# Patient Record
Sex: MALE | Race: BLACK OR AFRICAN AMERICAN | Employment: FULL TIME | URBAN - METROPOLITAN AREA
[De-identification: names, ages, dates, MRNs, and addresses within clinical notes are randomized per-mention and may not be internally consistent; named-entity substitution may affect disease eponyms.]

---

## 2017-01-11 DIAGNOSIS — I10 ESSENTIAL HYPERTENSION: ICD-10-CM

## 2017-01-11 RX ORDER — HYDROCHLOROTHIAZIDE 25 MG/1
TABLET ORAL
Qty: 90 TAB | Refills: 2 | Status: SHIPPED | OUTPATIENT
Start: 2017-01-11

## 2017-02-08 LAB
CREATININE, EXTERNAL: 0.71
HBA1C MFR BLD HPLC: 7 %
LDL-C, EXTERNAL: 81
MICROALBUMIN UR TEST STR-MCNC: 0.7 MG/DL

## 2017-02-23 ENCOUNTER — OFFICE VISIT (OUTPATIENT)
Dept: ENDOCRINOLOGY | Age: 37
End: 2017-02-23

## 2017-02-23 VITALS
WEIGHT: 315 LBS | BODY MASS INDEX: 46.65 KG/M2 | HEART RATE: 80 BPM | TEMPERATURE: 97.7 F | SYSTOLIC BLOOD PRESSURE: 122 MMHG | RESPIRATION RATE: 24 BRPM | DIASTOLIC BLOOD PRESSURE: 71 MMHG | HEIGHT: 69 IN

## 2017-02-23 DIAGNOSIS — Z79.4 TYPE 2 DIABETES MELLITUS WITH HYPERGLYCEMIA, WITH LONG-TERM CURRENT USE OF INSULIN (HCC): Primary | ICD-10-CM

## 2017-02-23 DIAGNOSIS — I10 ESSENTIAL HYPERTENSION: ICD-10-CM

## 2017-02-23 DIAGNOSIS — E11.65 TYPE 2 DIABETES MELLITUS WITH HYPERGLYCEMIA, WITH LONG-TERM CURRENT USE OF INSULIN (HCC): Primary | ICD-10-CM

## 2017-02-23 DIAGNOSIS — E78.2 MIXED HYPERLIPIDEMIA: ICD-10-CM

## 2017-02-23 NOTE — MR AVS SNAPSHOT
Visit Information Date & Time Provider Department Dept. Phone Encounter #  
 2/23/2017  9:45 AM Karolina Oconnor MD [facility] Diabetes [facility] Endocr[facility]ology [phone] [id number] Follow-up Instructions Return in about 3 months (around 5/23/2017). Upcoming Health Maintenance Date Due  
 FOOT EXAM Q1 5/26/1989 EYE EXAM RETINAL OR DILATED Q1 5/26/1989 Pneumococcal 19-64 Medium Risk (1 of 1 - PPSV23) 5/26/1998 DTaP/Tdap/Td series (1 - Tdap) 5/26/2000 INFLUENZA AGE 9 TO ADULT 8/1/2016 MICROALBUMIN Q1 2/25/2017 HEMOGLOBIN A1C Q6M 3/1/2017 LIPID PANEL Q1 5/27/2017 Allergies as of 2/23/2017  Review Complete On: 2/23/2017 By: Fernando Johnston LPN No Known Allergies Current Immunizations  Never Reviewed No immunizations on file. Not reviewed this visit You Were Diagnosed With   
  
 Codes Comments Type 2 diabetes mellitus with hyperglycemia, with long-term current use of insulin (HCC)    -  Primary ICD-10-CM: E11.65, Z79.4 ICD-9-CM: 250.00, 790.29, V58.67 Essential hypertension     ICD-10-CM: I10 
ICD-9-CM: 401.9 Mixed hyperlipidemia     ICD-10-CM: E78.2 ICD-9-CM: 272.2 Vitals BP  
  
  
  
  
  
 122/71 (BP 1 Location: Right arm, BP Patient Position: Sitting) BMI and BSA Data Body Mass Index Body Surface Area 54.31 kg/m 2 2.85 m 2 [phone] Your Updated Medication List  
  
   
This list is accurate as of: 2/23/17 10:57 AM.  Always use your most recent med list.  
  
  
  
  
 cyclobenzaprine 10 mg tablet Commonly known as:  FLEXERIL  
TAKE 1 TABLET TWICE A DAY AS NEEDED  
  
 furosemide 20 mg tablet Commonly known as:  LASIX Take 1 tab every 3 days as neeed  
  
 glucose blood VI test strips strip Commonly known as:  CONTOUR NEXT STRIPS  
 Test blood glucose twice daily Dx Code: E11.65  
  
 hydroCHLOROthiazide 25 mg tablet Commonly known as:  HYDRODIURIL  
TAKE 1 TABLET DAILY  
  
 insulin detemir 100 unit/mL injection Commonly known as:  LEVEMIR Inject 36 units daily  
  
 lisinopril 40 mg tablet Commonly known as:  Brad Shutters Take 1 Tab by mouth daily. metFORMIN 1,000 mg tablet Commonly known as:  GLUCOPHAGE Take 1 Tab by mouth two (2) times daily (with meals). metoprolol tartrate 50 mg tablet Commonly known as:  LOPRESSOR Take 1 Tab by mouth daily. simvastatin 20 mg tablet Commonly known as:  ZOCOR Take 1 Tab by mouth nightly. Follow-up Instructions Return in about 3 months (around 5/23/2017). Introducing [facility] & HEALTH SERVICES! [employer] introduces FedBid patient portal. Now you can access parts of your medical record, email your doctor's office, and request medication refills online. 1. In your internet browser, go to [email or web address] 2. Click on the First Time User? Click Here link in the Sign In box. You will see the New Member Sign Up page. 3. Enter your FedBid Access Code exactly as it appears below. You will not need to use this code after youve completed the sign-up process. If you do not sign up before the expiration date, you must request a new code. · FedBid Access Code: [email or web address] Expires: 5/24/2017 10:57 AM 
 
4. Enter the last four digits of your Social Security Number (xxxx) and Date of Birth (mm/dd/yyyy) as indicated and click Submit. You will be taken to the next sign-up page. 5. Create a FedBid ID. This will be your FedBid login ID and cannot be changed, so think of one that is secure and easy to remember. 6. Create a FedBid password. You can change your password at any time. 7. Enter your Password Reset Question and Answer. This can be used at a later time if you forget your password. 8. Enter your e-mail address. You will receive e-mail notification when new information is available in [address] 9. Click Sign Up. You can now view and download portions of your medical record. 10. Click the Download Summary menu link to download a portable copy of your medical information. If you have questions, please visit the Frequently Asked Questions section of the MangoPlate website. Remember, MangoPlate is NOT to be used for urgent needs. For medical emergencies, dial 911. Now available from your iPhone and Android! Please provide this summary of care documentation to your next provider. Your primary care clinician is listed as [address] [facility]. If you have any questions after today's visit, please call [phone].

## 2017-02-23 NOTE — PROGRESS NOTES
[facility]               Jessica Boles MD        [address] [phone] Fax [phone]  OV-HY) [address] [place] [address] JT:[phone] Fax [phone] ( Monday)          Patient Information  Date:2/23/2017  Name : Kady Patel 39 y.o.     YOB: 1980         Referred by: MD Shraddha Perea MD      Chief Complaint   Patient presents with    Diabetes     f/u       History of Present Illness: Kady Patel is a 39 y.o. male here for fu  of  Type 2 Diabetes Mellitus. He was seen a month ago and his A1c was 7.4. Stopped the medications and diet was unhealthy. He was hospitalized with ketoacidosis, glucose was 500, A1c increased to 10. Reviewed hospital records, no infection. Reports to have had  DKA three years ago. Checking glucose at home  Has not noticed significant hypoglycemia     Compliant with medications    No acute issues          Fasting < 130     He has severe insulin resistance. Wt Readings from Last 3 Encounters:   02/23/17 (!) 367 lb 12.8 oz (166.8 kg)   11/14/16 (!) 351 lb (159.2 kg)   10/18/16 345 lb 6.4 oz (156.7 kg)       BP Readings from Last 3 Encounters:   02/23/17 122/71   11/14/16 134/85   10/18/16 116/81           Past Medical History:   Diagnosis Date    Bronchitis     Diabetes ([facility] [address]     HTN (hypertension)     Hyperlipidemia      Current Outpatient Prescriptions   Medication Sig    hydroCHLOROthiazide (HYDRODIURIL) 25 mg tablet TAKE 1 TABLET DAILY    glucose blood VI test strips (CONTOUR NEXT STRIPS) strip Test blood glucose twice daily Dx Code: E11.65    cyclobenzaprine (FLEXERIL) 10 mg tablet TAKE 1 TABLET TWICE A DAY AS NEEDED    insulin detemir (LEVEMIR) 100 unit/mL injection Inject 36 units daily    furosemide (LASIX) 20 mg tablet Take 1 tab every 3 days as neeed    simvastatin (ZOCOR) 20 mg tablet Take 1 Tab by mouth nightly.     lisinopril (PRINIVIL, ZESTRIL) 40 mg tablet Take 1 Tab by mouth daily.  metoprolol (LOPRESSOR) 50 mg tablet Take 1 Tab by mouth daily. (Patient taking differently: Take 25 mg by mouth daily.)    metFORMIN (GLUCOPHAGE) 1,000 mg tablet Take 1 Tab by mouth two (2) times daily (with meals). No current facility-administered medications for this visit. No Known Allergies      Review of Systems:  - Constitutional Symptoms: no fevers, no chills,  - Eyes: no blurry vision no double vision  - Musculoskeletal: no joint pains +  weakness  - Integumentary: no rashes  - Neurological: no numbness, tingling, +  headaches  -   -     Physical Examination:   Blood pressure 122/71, pulse 80, temperature 97.7 °F (36.5 °C), temperature source Oral, resp. rate 24, height 5' 9\" (1.753 m), weight (!) 367 lb 12.8 oz (166.8 kg). Estimated body mass index is 54.31 kg/(m^2) as calculated from the following:    Height as of this encounter: 5' 9\" (1.753 m). -   Weight as of this encounter: 367 lb 12.8 oz (166.8 kg). - General: pleasant, no distress, good eye contact,obese  - HEENT: no pallor, no periorbital edema, EOMI  - Neck: supple, no thyromegaly,   - Cardiovascular: regular, normal rate, normal S1 and S2,  - Respiratory: clear to auscultation bilaterally,  - Gastrointestinal: soft, nontender, nondistended,  BS +  - Musculoskeletal: 2 + edema, venous stasis changes, flat feet   - Neurological: alert and oriented  - Psychiatric: normal mood and affect  - Skin: color, texture, turgor normal.       Data Reviewed:     [] Glucose records reviewed. [] See glucose records for details (to be scanned).   [] A1C  [] Reviewed labs    Lab Results   Component Value Date/Time    Hemoglobin A1c 6.7 05/27/2016 10:24 AM    Hemoglobin A1c 6.3 02/25/2016 09:09 AM    Hemoglobin A1c 6.8 07/09/2015 08:10 AM    Glucose 259 10/18/2016 10:39 AM    Glucose  11/23/2015 10:45 AM    Microalb/Creat ratio (ug/mg creat.) 3.7 02/25/2016 09:09 AM    LDL, calculated 81 05/27/2016 10:24 AM    Creatinine 0.89 10/18/2016 10:39 AM    Hemoglobin A1c, External 7.30 11/13/2014 04:29 AM      Lab Results   Component Value Date/Time    Cholesterol, total 141 05/27/2016 10:24 AM    HDL Cholesterol 43 05/27/2016 10:24 AM    LDL, calculated 81 05/27/2016 10:24 AM    Triglyceride 83 05/27/2016 10:24 AM     Lab Results   Component Value Date/Time    ALT (SGPT) 49 10/18/2016 10:39 AM    AST (SGOT) 23 10/18/2016 10:39 AM    Alk. phosphatase 68 10/18/2016 10:39 AM    Bilirubin, total 0.4 10/18/2016 10:39 AM     Lab Results   Component Value Date/Time    GFR est  10/18/2016 10:39 AM    GFR est non- 10/18/2016 10:39 AM    Creatinine 0.89 10/18/2016 10:39 AM    BUN 9 10/18/2016 10:39 AM    Sodium 136 10/18/2016 10:39 AM    Potassium 3.5 10/18/2016 10:39 AM    Chloride 89 10/18/2016 10:39 AM    CO2 27 10/18/2016 10:39 AM      Lab Results   Component Value Date/Time    TSH 1.050 07/09/2015 08:10 AM        Assessment/Plan:     1. Type 2 diabetes mellitus with hyperglycemia, with long-term current use of insulin ([address]    2. Essential hypertension    3. Mixed hyperlipidemia        1. Type 2 Diabetes Mellitus with no nephropathy,neuropathy,retinopathy  Lab Results   Component Value Date/Time    Hemoglobin A1c 6.7 05/27/2016 10:24 AM    Hemoglobin A1c (POC) 7.4 09/01/2016 11:38 AM    Hemoglobin A1c, External 7.30 11/13/2014 04:29 AM   A1C is 7     metformin  Levemir 36 units   Check C peptide, REGAN  DKA x2       2. HTN : Continue current therapy , CPAP     3. Hyperlipidemia : Continue statin. 4.Obesity:Body mass index is 54.31 kg/(m^2). Discussed about the importance of exercise and carbohydrate portion control. TSH normal   DST normal   He is exercising and eating healthy   2500 - 2800 cari diet   Weight loss encouraged     5 EMRE - CPAP -         There are no Patient Instructions on file for this visit.   Follow-up Disposition: Not on File    Thank you for allowing me to participate in the care of this patient. Chintan Araujo MD    >50% time spent counseling - diet , compliance with meds. , excercise

## 2017-02-23 NOTE — PROGRESS NOTES
Leighton Hood is a 39 y.o. male here for   Chief Complaint   Patient presents with    Diabetes     f/u       Functional glucose monitor and record keeping system? - yes  Eye exam within last year? - yes  Foot exam within last year? - yes    Lab Results   Component Value Date/Time    Hemoglobin A1c 6.7 05/27/2016 10:24 AM    Hemoglobin A1c (POC) 7.4 09/01/2016 11:38 AM    Hemoglobin A1c, External 7.30 11/13/2014 04:29 AM       Wt Readings from Last 3 Encounters:   11/14/16 (!) 351 lb (159.2 kg)   10/18/16 345 lb 6.4 oz (156.7 kg)   09/01/16 (!) 377 lb (171 kg)     Temp Readings from Last 3 Encounters:   11/14/16 98 °F (36.7 °C) (Oral)   10/18/16 98.2 °F (36.8 °C) (Oral)   09/01/16 98.4 °F (36.9 °C)     BP Readings from Last 3 Encounters:   11/14/16 134/85   10/18/16 116/81   09/01/16 122/72     Pulse Readings from Last 3 Encounters:   11/14/16 79   10/18/16 (!) 106   09/01/16 83     '

## 2017-02-24 DIAGNOSIS — Z79.4 UNCONTROLLED TYPE 2 DIABETES MELLITUS WITH HYPERGLYCEMIA, WITH LONG-TERM CURRENT USE OF INSULIN (HCC): ICD-10-CM

## 2017-02-24 DIAGNOSIS — E11.65 UNCONTROLLED TYPE 2 DIABETES MELLITUS WITH HYPERGLYCEMIA, WITH LONG-TERM CURRENT USE OF INSULIN (HCC): ICD-10-CM

## 2017-02-24 DIAGNOSIS — E78.2 MIXED HYPERLIPIDEMIA: ICD-10-CM

## 2017-02-24 DIAGNOSIS — I10 ESSENTIAL HYPERTENSION: ICD-10-CM

## 2017-02-24 RX ORDER — METOPROLOL TARTRATE 50 MG/1
50 TABLET ORAL DAILY
Qty: 90 TAB | Refills: 3 | Status: SHIPPED | OUTPATIENT
Start: 2017-02-24 | End: 2017-03-29 | Stop reason: SDUPTHER

## 2017-03-05 DIAGNOSIS — E78.2 MIXED HYPERLIPIDEMIA: ICD-10-CM

## 2017-03-05 DIAGNOSIS — I10 ESSENTIAL HYPERTENSION: ICD-10-CM

## 2017-03-05 RX ORDER — SIMVASTATIN 20 MG/1
TABLET, FILM COATED ORAL
Qty: 90 TAB | Refills: 2 | Status: SHIPPED | OUTPATIENT
Start: 2017-03-05 | End: 2020-04-17 | Stop reason: SDUPTHER

## 2017-03-05 RX ORDER — LISINOPRIL 40 MG/1
TABLET ORAL
Qty: 90 TAB | Refills: 2 | Status: SHIPPED | OUTPATIENT
Start: 2017-03-05 | End: 2019-02-07

## 2017-03-10 DIAGNOSIS — E11.65 TYPE 2 DIABETES MELLITUS WITH HYPERGLYCEMIA, WITH LONG-TERM CURRENT USE OF INSULIN (HCC): ICD-10-CM

## 2017-03-10 DIAGNOSIS — E78.2 MIXED HYPERLIPIDEMIA: ICD-10-CM

## 2017-03-10 DIAGNOSIS — I10 ESSENTIAL HYPERTENSION: ICD-10-CM

## 2017-03-10 DIAGNOSIS — Z79.4 TYPE 2 DIABETES MELLITUS WITH HYPERGLYCEMIA, WITH LONG-TERM CURRENT USE OF INSULIN (HCC): ICD-10-CM

## 2017-03-13 DIAGNOSIS — Z79.4 TYPE 2 DIABETES MELLITUS WITH HYPERGLYCEMIA, WITH LONG-TERM CURRENT USE OF INSULIN (HCC): Primary | ICD-10-CM

## 2017-03-13 DIAGNOSIS — E11.65 UNCONTROLLED TYPE 2 DIABETES MELLITUS WITH HYPERGLYCEMIA, WITH LONG-TERM CURRENT USE OF INSULIN (HCC): ICD-10-CM

## 2017-03-13 DIAGNOSIS — Z79.4 UNCONTROLLED TYPE 2 DIABETES MELLITUS WITH HYPERGLYCEMIA, WITH LONG-TERM CURRENT USE OF INSULIN (HCC): ICD-10-CM

## 2017-03-13 DIAGNOSIS — E11.65 TYPE 2 DIABETES MELLITUS WITH HYPERGLYCEMIA, WITH LONG-TERM CURRENT USE OF INSULIN (HCC): Primary | ICD-10-CM

## 2017-03-20 DIAGNOSIS — Z79.4 TYPE 2 DIABETES MELLITUS WITH HYPERGLYCEMIA, WITH LONG-TERM CURRENT USE OF INSULIN (HCC): Primary | ICD-10-CM

## 2017-03-20 DIAGNOSIS — E11.65 TYPE 2 DIABETES MELLITUS WITH HYPERGLYCEMIA, WITH LONG-TERM CURRENT USE OF INSULIN (HCC): Primary | ICD-10-CM

## 2017-03-20 RX ORDER — INSULIN GLARGINE 100 [IU]/ML
INJECTION, SOLUTION SUBCUTANEOUS
Qty: 45 ML | Refills: 3 | Status: SHIPPED | OUTPATIENT
Start: 2017-03-20 | End: 2017-03-24 | Stop reason: SDUPTHER

## 2017-03-20 RX ORDER — PEN NEEDLE, DIABETIC 31 GX3/16"
NEEDLE, DISPOSABLE MISCELLANEOUS
Qty: 100 PEN NEEDLE | Refills: 3 | Status: SHIPPED | OUTPATIENT
Start: 2017-03-20 | End: 2017-03-24 | Stop reason: SDUPTHER

## 2017-03-20 NOTE — TELEPHONE ENCOUNTER
Pt came in stating that his levemir insulin is going to cost him around $500 and he cannot afford that. Can something else be called in, or do we know if a PA is required. Please call pt let him know what needs to be done.

## 2017-03-24 DIAGNOSIS — E11.65 TYPE 2 DIABETES MELLITUS WITH HYPERGLYCEMIA, WITH LONG-TERM CURRENT USE OF INSULIN (HCC): ICD-10-CM

## 2017-03-24 DIAGNOSIS — Z79.4 TYPE 2 DIABETES MELLITUS WITH HYPERGLYCEMIA, WITH LONG-TERM CURRENT USE OF INSULIN (HCC): ICD-10-CM

## 2017-03-24 RX ORDER — PEN NEEDLE, DIABETIC 31 GX3/16"
NEEDLE, DISPOSABLE MISCELLANEOUS
Qty: 100 PEN NEEDLE | Refills: 3 | Status: SHIPPED | OUTPATIENT
Start: 2017-03-24 | End: 2018-05-04 | Stop reason: SDUPTHER

## 2017-03-24 RX ORDER — INSULIN GLARGINE 100 [IU]/ML
INJECTION, SOLUTION SUBCUTANEOUS
Qty: 45 ML | Refills: 3 | Status: SHIPPED | OUTPATIENT
Start: 2017-03-24 | End: 2017-05-24 | Stop reason: SDUPTHER

## 2017-03-24 NOTE — TELEPHONE ENCOUNTER
Patient says Mario Arpitadele is too expensive and ailyn would like something cheap sent to [facility] pharmacy. Patient says he will run out soon.

## 2017-03-29 DIAGNOSIS — I10 ESSENTIAL HYPERTENSION: ICD-10-CM

## 2017-03-29 DIAGNOSIS — Z79.4 UNCONTROLLED TYPE 2 DIABETES MELLITUS WITH HYPERGLYCEMIA, WITH LONG-TERM CURRENT USE OF INSULIN (HCC): ICD-10-CM

## 2017-03-29 DIAGNOSIS — E78.2 MIXED HYPERLIPIDEMIA: ICD-10-CM

## 2017-03-29 DIAGNOSIS — E11.65 UNCONTROLLED TYPE 2 DIABETES MELLITUS WITH HYPERGLYCEMIA, WITH LONG-TERM CURRENT USE OF INSULIN (HCC): ICD-10-CM

## 2017-03-29 RX ORDER — METOPROLOL TARTRATE 50 MG/1
50 TABLET ORAL DAILY
Qty: 90 TAB | Refills: 3 | Status: SHIPPED | OUTPATIENT
Start: 2017-03-29

## 2017-04-26 ENCOUNTER — TELEPHONE (OUTPATIENT)
Dept: ENDOCRINOLOGY | Age: 37
End: 2017-04-26

## 2017-04-26 DIAGNOSIS — E11.65 TYPE 2 DIABETES MELLITUS WITH HYPERGLYCEMIA, WITH LONG-TERM CURRENT USE OF INSULIN (HCC): Primary | ICD-10-CM

## 2017-04-26 DIAGNOSIS — Z79.4 TYPE 2 DIABETES MELLITUS WITH HYPERGLYCEMIA, WITH LONG-TERM CURRENT USE OF INSULIN (HCC): Primary | ICD-10-CM

## 2017-05-24 ENCOUNTER — OFFICE VISIT (OUTPATIENT)
Dept: ENDOCRINOLOGY | Age: 37
End: 2017-05-24

## 2017-05-24 VITALS
SYSTOLIC BLOOD PRESSURE: 126 MMHG | TEMPERATURE: 98.8 F | DIASTOLIC BLOOD PRESSURE: 78 MMHG | BODY MASS INDEX: 46.65 KG/M2 | OXYGEN SATURATION: 90 % | WEIGHT: 315 LBS | HEART RATE: 89 BPM | HEIGHT: 69 IN | RESPIRATION RATE: 18 BRPM

## 2017-05-24 DIAGNOSIS — E11.65 TYPE 2 DIABETES MELLITUS WITH HYPERGLYCEMIA, WITH LONG-TERM CURRENT USE OF INSULIN (HCC): Primary | ICD-10-CM

## 2017-05-24 DIAGNOSIS — Z79.4 UNCONTROLLED TYPE 2 DIABETES MELLITUS WITH HYPERGLYCEMIA, WITH LONG-TERM CURRENT USE OF INSULIN (HCC): Primary | ICD-10-CM

## 2017-05-24 DIAGNOSIS — E11.65 UNCONTROLLED TYPE 2 DIABETES MELLITUS WITH HYPERGLYCEMIA, WITH LONG-TERM CURRENT USE OF INSULIN (HCC): Primary | ICD-10-CM

## 2017-05-24 DIAGNOSIS — I10 ESSENTIAL HYPERTENSION: ICD-10-CM

## 2017-05-24 DIAGNOSIS — Z79.4 TYPE 2 DIABETES MELLITUS WITH HYPERGLYCEMIA, WITH LONG-TERM CURRENT USE OF INSULIN (HCC): Primary | ICD-10-CM

## 2017-05-24 DIAGNOSIS — E78.2 MIXED HYPERLIPIDEMIA: ICD-10-CM

## 2017-05-24 LAB
GLUCOSE POC: 309 MG/DL
HBA1C MFR BLD HPLC: 11 %

## 2017-05-24 RX ORDER — INSULIN GLARGINE 100 [IU]/ML
40 INJECTION, SOLUTION SUBCUTANEOUS DAILY
Qty: 15 ML | Refills: 11 | Status: SHIPPED | OUTPATIENT
Start: 2017-05-24 | End: 2017-07-14 | Stop reason: ALTCHOICE

## 2017-05-24 NOTE — PROGRESS NOTES
Eye exam: within last year  Foot exam: within last year    Lab Results   Component Value Date/Time    Hemoglobin A1c 6.7 05/27/2016 10:24 AM    Hemoglobin A1c (POC) 7.4 09/01/2016 11:38 AM    Hemoglobin A1c, External 7.0 02/08/2017

## 2017-05-24 NOTE — MR AVS SNAPSHOT
Visit Information Date & Time Provider Department Dept. Phone Encounter #  
 5/24/2017  9:30 AM Jaylin Wolf MD [facility] Endocrinology [phone] [id number] Follow-up Instructions Return in about 2 months (around 7/24/2017). Upcoming Health Maintenance Date Due  
 FOOT EXAM Q1 5/26/1989 EYE EXAM RETINAL OR DILATED Q1 5/26/1989 Pneumococcal 19-64 Medium Risk (1 of 1 - PPSV23) 5/26/1998 DTaP/Tdap/Td series (1 - Tdap) 5/26/2000 INFLUENZA AGE 9 TO ADULT 8/1/2017 HEMOGLOBIN A1C Q6M 8/8/2017 MICROALBUMIN Q1 2/8/2018 LIPID PANEL Q1 2/8/2018 Allergies as of 5/24/2017  Review Complete On: 5/24/2017 By: Teto Gil LPN No Known Allergies Current Immunizations  Never Reviewed No immunizations on file. Not reviewed this visit You Were Diagnosed With   
  
 Codes Comments Uncontrolled type 2 diabetes mellitus with hyperglycemia, with long-term current use of insulin (HCC)    -  Primary ICD-10-CM: E11.65, Z79.4 ICD-9-CM: 250.02, V58.67 Vitals BP Pulse Temp Resp Height(growth percentile) Weight(growth percentile) 126/78 (BP 1 Location: Left arm, BP Patient Position: Sitting) 89 98.8 °F (37.1 °C) (Oral) 18 5' 9\" (1.753 m) (!) 365 lb (165.6 kg) SpO2 BMI Smoking Status 90% 53.9 kg/m2 Former Smoker Vitals History BMI and BSA Data Body Mass Index Body Surface Area 53.9 kg/m 2 2.84 m 2 Preferred Pharmacy Pharmacy Name Phone [id number], [address] [facility] [phone] Your Updated Medication List  
  
   
This list is accurate as of: 5/24/17  9:48 AM.  Always use your most recent med list.  
  
  
  
  
 cyclobenzaprine 10 mg tablet Commonly known as:  FLEXERIL  
TAKE 1 TABLET TWICE A DAY AS NEEDED  
  
 furosemide 20 mg tablet Commonly known as:  LASIX Take 1 tab every 3 days as neeed  
  
 glucose blood VI test strips strip Commonly known as:  ONETOUCH ULTRA TEST Test blood glucose three times daily Dx Code: E11.65  
  
 hydroCHLOROthiazide 25 mg tablet Commonly known as:  HYDRODIURIL  
TAKE 1 TABLET DAILY  
  
 insulin glargine 100 unit/mL (3 mL) Inpn Commonly known asTara Mo Inject 36 units daily Stop Levemir Insulin Needles (Disposable) 32 gauge x 5/32\" Ndle Use to inject Basaglar daily Dx Code: E11.65  
  
 lisinopril 40 mg tablet Commonly known as:  PRINIVIL, ZESTRIL  
TAKE 1 TABLET DAILY. DISREGARD 30 MG DOSE  
  
 metFORMIN 1,000 mg tablet Commonly known as:  GLUCOPHAGE Take 1 Tab by mouth two (2) times daily (with meals). metoprolol tartrate 50 mg tablet Commonly known as:  LOPRESSOR Take 1 Tab by mouth daily. simvastatin 20 mg tablet Commonly known as:  ZOCOR  
TAKE 1 TABLET NIGHTLY We Performed the Following AMB POC GLUCOSE BLOOD, BY GLUCOSE MONITORING DEVICE [12131 CPT(R)] AMB POC HEMOGLOBIN A1C [83992 CPT(R)] Follow-up Instructions Return in about 2 months (around 7/24/2017). Patient Instructions Start Lantus Introducing \[facility]\"" & HEALTH SERVICES! [employer] introduces [employer] patient portal. Now you can access parts of your medical record, email your doctor's office, and request medication refills online. 1. In your internet browser, go to [email or web address] 2. Click on the First Time User? Click Here link in the Sign In box. You will see the New Member Sign Up page. 3. Enter your [employer] Access Code exactly as it appears below. You will not need to use this code after youve completed the sign-up process. If you do not sign up before the expiration date, you must request a new code. · [employer] Access Code: [email or web address] Expires: 5/24/2017 11:57 AM 
 
4. Enter the last four digits of your Social Security Number (xxxx) and Date of Birth (mm/dd/yyyy) as indicated and click Submit.  You will be taken to the next sign-up page. 5. Create a MediaMath ID. This will be your MediaMath login ID and cannot be changed, so think of one that is secure and easy to remember. 6. Create a MediaMath password. You can change your password at any time. 7. Enter your Password Reset Question and Answer. This can be used at a later time if you forget your password. 8. Enter your e-mail address. You will receive e-mail notification when new information is available in [address] 9. Click Sign Up. You can now view and download portions of your medical record. 10. Click the Download Summary menu link to download a portable copy of your medical information. If you have questions, please visit the Frequently Asked Questions section of the MediaMath website. Remember, MediaMath is NOT to be used for urgent needs. For medical emergencies, dial 911. Now available from your iPhone and Android! Please provide this summary of care documentation to your next provider. Your primary care clinician is listed as [address] [facility]. If you have any questions after today's visit, please call [phone].

## 2017-05-24 NOTE — PROGRESS NOTES
[facility]               Aranza Michelle MD        [address] 78 [phone] Fax [phone] ( T          Patient Information  Date:5/24/2017  Name : Skeet Barthel 39 y.o.     YOB: 1980         Referred by: MD Golden Alvarez MD      Chief Complaint   Patient presents with    Diabetes       History of Present Illness: Skeet Barthel is a 39 y.o. male here for fu  of  Type 2 Diabetes Mellitus. Reports to have had  DKA three years ago. A1c in February was 7 and now it is 11, blood glucose is 300. Reports to be taking insulin, however it is expensive and he has to pay $300 and I'm not sure he's taking it consistently. On Metformin. No polyuria. No steroid injection. He has severe insulin resistance. Wt Readings from Last 3 Encounters:   05/24/17 (!) 365 lb (165.6 kg)   02/23/17 (!) 367 lb 12.8 oz (166.8 kg)   11/14/16 (!) 351 lb (159.2 kg)       BP Readings from Last 3 Encounters:   05/24/17 126/78   02/23/17 122/71   11/14/16 134/85           Past Medical History:   Diagnosis Date    Bronchitis     Diabetes ([address]     HTN (hypertension)     Hyperlipidemia      Current Outpatient Prescriptions   Medication Sig    glucose blood VI test strips (ONETOUCH ULTRA TEST) strip Test blood glucose three times daily Dx Code: E11.65    metoprolol tartrate (LOPRESSOR) 50 mg tablet Take 1 Tab by mouth daily.  insulin glargine (BASAGLAR KWIKPEN) 100 unit/mL (3 mL) pen Inject 36 units daily Stop Levemir    Insulin Needles, Disposable, 32 gauge x 5/32\" ndle Use to inject Basaglar daily Dx Code: E11.65    simvastatin (ZOCOR) 20 mg tablet TAKE 1 TABLET NIGHTLY    lisinopril (PRINIVIL, ZESTRIL) 40 mg tablet TAKE 1 TABLET DAILY.  DISREGARD 30 MG DOSE    hydroCHLOROthiazide (HYDRODIURIL) 25 mg tablet TAKE 1 TABLET DAILY    furosemide (LASIX) 20 mg tablet Take 1 tab every 3 days as neeed    metFORMIN (GLUCOPHAGE) 1,000 mg tablet Take 1 Tab by mouth two (2) times daily (with meals).  cyclobenzaprine (FLEXERIL) 10 mg tablet TAKE 1 TABLET TWICE A DAY AS NEEDED     No current facility-administered medications for this visit. No Known Allergies      Review of Systems:  - Constitutional Symptoms: no fevers, no chills,  - Eyes: no blurry vision no double vision  - Musculoskeletal: no joint pains +  weakness  - Integumentary: no rashes  - Neurological: no numbness, tingling, +  headaches  -   -     Physical Examination:   Blood pressure 126/78, pulse 89, temperature 98.8 °F (37.1 °C), temperature source Oral, resp. rate 18, height 5' 9\" (1.753 m), weight (!) 365 lb (165.6 kg), SpO2 90 %. Estimated body mass index is 53.9 kg/(m^2) as calculated from the following:    Height as of this encounter: 5' 9\" (1.753 m). -   Weight as of this encounter: 365 lb (165.6 kg). - General: pleasant, no distress, good eye contact,obese  - HEENT: no pallor, no periorbital edema, EOMI  - Neck: supple, no thyromegaly,   - Cardiovascular: regular, normal rate, normal S1 and S2,  - Respiratory: clear to auscultation bilaterally,  - Gastrointestinal: soft, nontender, nondistended,  BS +  - Musculoskeletal: 2 + edema, venous stasis changes, flat feet   - Neurological: alert and oriented  - Psychiatric: normal mood and affect  - Skin: color, texture, turgor normal.       Data Reviewed:     [] Glucose records reviewed. [] See glucose records for details (to be scanned).   [] A1C  [] Reviewed labs    Lab Results   Component Value Date/Time    Hemoglobin A1c 6.7 05/27/2016 10:24 AM    Hemoglobin A1c 6.3 02/25/2016 09:09 AM    Hemoglobin A1c 6.8 07/09/2015 08:10 AM    Glucose 259 10/18/2016 10:39 AM    Glucose  05/24/2017 09:21 AM    Microalb/Creat ratio (ug/mg creat.) 3.7 02/25/2016 09:09 AM    LDL, calculated 81 05/27/2016 10:24 AM    Creatinine 0.89 10/18/2016 10:39 AM    Hemoglobin A1c, External 7.0 02/08/2017    Hemoglobin A1c, External 7.30 11/13/2014 04:29 AM      Lab Results   Component Value Date/Time    Cholesterol, total 141 05/27/2016 10:24 AM    HDL Cholesterol 43 05/27/2016 10:24 AM    LDL, calculated 81 05/27/2016 10:24 AM    Triglyceride 83 05/27/2016 10:24 AM     Lab Results   Component Value Date/Time    ALT (SGPT) 49 10/18/2016 10:39 AM    AST (SGOT) 23 10/18/2016 10:39 AM    Alk. phosphatase 68 10/18/2016 10:39 AM    Bilirubin, total 0.4 10/18/2016 10:39 AM     Lab Results   Component Value Date/Time    GFR est  10/18/2016 10:39 AM    GFR est non- 10/18/2016 10:39 AM    Creatinine 0.89 10/18/2016 10:39 AM    BUN 9 10/18/2016 10:39 AM    Sodium 136 10/18/2016 10:39 AM    Potassium 3.5 10/18/2016 10:39 AM    Chloride 89 10/18/2016 10:39 AM    CO2 27 10/18/2016 10:39 AM      Lab Results   Component Value Date/Time    TSH 1.050 07/09/2015 08:10 AM        Assessment/Plan:     1. Uncontrolled type 2 diabetes mellitus with hyperglycemia, with long-term current use of insulin ([address]        1. Type 2 Diabetes Mellitus with no nephropathy,neuropathy,retinopathy  Lab Results   Component Value Date/Time    Hemoglobin A1c 6.7 05/27/2016 10:24 AM    Hemoglobin A1c (POC) 11.0 05/24/2017 09:22 AM    Hemoglobin A1c, External 7.0 02/08/2017   Very high deductible plan, his deductible is $5,000 and not able to afford insulin. Switch it to Lantus and gave discount card for $10.    Probably compliance is decreased due to expense of the insulin. Continue Metformin. Discussed the goals of blood glucose. metformin  DKA x2       2. HTN : Continue current therapy , CPAP     3. Hyperlipidemia : Continue statin. 4.Obesity:Body mass index is 53.9 kg/(m^2). Discussed about the importance of exercise and carbohydrate portion control. TSH normal   DST normal   He is exercising and eating healthy   2500 - 2800 cari diet   Weight loss encouraged     5 EMRE - CPAP -         There are no Patient Instructions on file for this visit.   Follow-up Disposition: Not on File    Thank you for allowing me to participate in the care of this patient. Atif aJcobs MD    >50% time spent counseling - diet , compliance with meds. , excercise

## 2017-07-05 ENCOUNTER — TELEPHONE (OUTPATIENT)
Dept: ENDOCRINOLOGY | Age: 37
End: 2017-07-05

## 2017-07-05 NOTE — TELEPHONE ENCOUNTER
He was on Basiglar and that he should be able to get for less than $10 with coupon       He can go back on [employer]

## 2017-07-05 NOTE — TELEPHONE ENCOUNTER
Patient just discharged  from hospital and they changed his insulin to Humulin, he can not afford it, can he go back to what he was originally on. Please call to confirm. Thank you.  [phone]

## 2017-07-05 NOTE — TELEPHONE ENCOUNTER
Informed pt of Dr. Navarrete General note. Pt verbalized understanding with no further questions or concerns at this time.

## 2017-07-14 DIAGNOSIS — E11.65 TYPE 2 DIABETES MELLITUS WITH HYPERGLYCEMIA, WITH LONG-TERM CURRENT USE OF INSULIN (HCC): Primary | ICD-10-CM

## 2017-07-14 DIAGNOSIS — Z79.4 TYPE 2 DIABETES MELLITUS WITH HYPERGLYCEMIA, WITH LONG-TERM CURRENT USE OF INSULIN (HCC): Primary | ICD-10-CM

## 2017-07-14 RX ORDER — INSULIN GLARGINE 100 [IU]/ML
INJECTION, SOLUTION SUBCUTANEOUS
Qty: 15 ML | Refills: 11 | Status: SHIPPED | OUTPATIENT
Start: 2017-07-14 | End: 2017-07-24 | Stop reason: ALTCHOICE

## 2017-07-21 ENCOUNTER — TELEPHONE (OUTPATIENT)
Dept: ENDOCRINOLOGY | Age: 37
End: 2017-07-21

## 2017-07-24 ENCOUNTER — OFFICE VISIT (OUTPATIENT)
Dept: ENDOCRINOLOGY | Age: 37
End: 2017-07-24

## 2017-07-24 ENCOUNTER — TELEPHONE (OUTPATIENT)
Dept: ENDOCRINOLOGY | Age: 37
End: 2017-07-24

## 2017-07-24 VITALS
RESPIRATION RATE: 18 BRPM | WEIGHT: 315 LBS | SYSTOLIC BLOOD PRESSURE: 126 MMHG | HEART RATE: 78 BPM | TEMPERATURE: 97.9 F | DIASTOLIC BLOOD PRESSURE: 73 MMHG | OXYGEN SATURATION: 90 % | HEIGHT: 69 IN | BODY MASS INDEX: 46.65 KG/M2

## 2017-07-24 DIAGNOSIS — E11.65 TYPE 2 DIABETES MELLITUS WITH HYPERGLYCEMIA, WITH LONG-TERM CURRENT USE OF INSULIN (HCC): Primary | ICD-10-CM

## 2017-07-24 DIAGNOSIS — E78.2 MIXED HYPERLIPIDEMIA: ICD-10-CM

## 2017-07-24 DIAGNOSIS — Z79.4 TYPE 2 DIABETES MELLITUS WITH HYPERGLYCEMIA, WITH LONG-TERM CURRENT USE OF INSULIN (HCC): Primary | ICD-10-CM

## 2017-07-24 DIAGNOSIS — I10 ESSENTIAL HYPERTENSION: ICD-10-CM

## 2017-07-24 RX ORDER — INSULIN GLARGINE 100 [IU]/ML
INJECTION, SOLUTION SUBCUTANEOUS
Qty: 15 ML | Refills: 11 | Status: SHIPPED | OUTPATIENT
Start: 2017-07-24 | End: 2019-02-09 | Stop reason: SDUPTHER

## 2017-07-24 NOTE — PROGRESS NOTES
[facility]               Christi Flores MD        [address] 78 [phone] Fax [phone] ( T          Patient Information  Date:7/24/2017  Name : Maria R Guillen 44 y.o.     YOB: 1980         Referred by: MD Sage Goldman MD      Chief Complaint   Patient presents with    Diabetes       History of Present Illness: Maria R Guillen is a 44 y.o. male here for fu  of  Type 2 Diabetes Mellitus. Reports to have had  DKA three years ago. When ever insulin is expensive he stops and then has hyperglycemia , goes to ER     I have discussed with him several times about $5 dollars Lantus with coupon and given coupon last visit       No polyuria. No steroid injection. He has severe insulin resistance. Wt Readings from Last 3 Encounters:   07/24/17 348 lb 3.2 oz (157.9 kg)   05/24/17 (!) 365 lb (165.6 kg)   02/23/17 (!) 367 lb 12.8 oz (166.8 kg)       BP Readings from Last 3 Encounters:   07/24/17 126/73   05/24/17 126/78   02/23/17 122/71           Past Medical History:   Diagnosis Date    Bronchitis     Diabetes ([facility] [address]     HTN (hypertension)     Hyperlipidemia      Current Outpatient Prescriptions   Medication Sig    glucose blood VI test strips (ONETOUCH ULTRA TEST) strip Test blood glucose three times daily Dx Code: E11.65    metoprolol tartrate (LOPRESSOR) 50 mg tablet Take 1 Tab by mouth daily.  Insulin Needles, Disposable, 32 gauge x 5/32\" ndle Use to inject Basaglar daily Dx Code: E11.65    simvastatin (ZOCOR) 20 mg tablet TAKE 1 TABLET NIGHTLY    lisinopril (PRINIVIL, ZESTRIL) 40 mg tablet TAKE 1 TABLET DAILY. DISREGARD 30 MG DOSE    cyclobenzaprine (FLEXERIL) 10 mg tablet TAKE 1 TABLET TWICE A DAY AS NEEDED    furosemide (LASIX) 20 mg tablet Take 1 tab every 3 days as neeed    metFORMIN (GLUCOPHAGE) 1,000 mg tablet Take 1 Tab by mouth two (2) times daily (with meals).     insulin glargine [facility] KWIKPEN) 100 unit/mL (3 mL) inpn Inject 40 units daily.  hydroCHLOROthiazide (HYDRODIURIL) 25 mg tablet TAKE 1 TABLET DAILY     No current facility-administered medications for this visit. No Known Allergies      Review of Systems:  - Constitutional Symptoms: no fevers, no chills,  - Eyes: no blurry vision no double vision  - Musculoskeletal: no joint pains +  weakness  - Integumentary: no rashes  - Neurological: no numbness, tingling, +  headaches  -   -     Physical Examination:   Blood pressure 126/73, pulse 78, temperature 97.9 °F (36.6 °C), temperature source Oral, resp. rate 18, height 5' 9\" (1.753 m), weight 348 lb 3.2 oz (157.9 kg), SpO2 90 %. Estimated body mass index is 51.42 kg/(m^2) as calculated from the following:    Height as of this encounter: 5' 9\" (1.753 m). -   Weight as of this encounter: 348 lb 3.2 oz (157.9 kg). - General: pleasant, no distress, good eye contact,obese  - HEENT: no pallor, no periorbital edema, EOMI  - Neck: supple, no thyromegaly,   - Cardiovascular: regular, normal rate, normal S1 and S2,  - Respiratory: clear to auscultation bilaterally,  - Gastrointestinal: soft, nontender, nondistended,  BS +  - Musculoskeletal: 2 + edema, venous stasis changes, flat feet   - Neurological: alert and oriented  - Psychiatric: normal mood and affect  - Skin: color, texture, turgor normal.       Data Reviewed:     [] Glucose records reviewed. [] See glucose records for details (to be scanned).   [] A1C  [] Reviewed labs    Lab Results   Component Value Date/Time    Hemoglobin A1c 6.7 05/27/2016 10:24 AM    Hemoglobin A1c 6.3 02/25/2016 09:09 AM    Hemoglobin A1c 6.8 07/09/2015 08:10 AM    Glucose 259 10/18/2016 10:39 AM    Glucose  05/24/2017 09:21 AM    Microalb/Creat ratio (ug/mg creat.) 3.7 02/25/2016 09:09 AM    LDL, calculated 81 05/27/2016 10:24 AM    Creatinine 0.89 10/18/2016 10:39 AM    Hemoglobin A1c, External 7.0 02/08/2017    Hemoglobin A1c, External 7.30 11/13/2014 04:29 AM      Lab Results   Component Value Date/Time    Cholesterol, total 141 05/27/2016 10:24 AM    HDL Cholesterol 43 05/27/2016 10:24 AM    LDL, calculated 81 05/27/2016 10:24 AM    Triglyceride 83 05/27/2016 10:24 AM     Lab Results   Component Value Date/Time    ALT (SGPT) 49 10/18/2016 10:39 AM    AST (SGOT) 23 10/18/2016 10:39 AM    Alk. phosphatase 68 10/18/2016 10:39 AM    Bilirubin, total 0.4 10/18/2016 10:39 AM     Lab Results   Component Value Date/Time    GFR est  10/18/2016 10:39 AM    GFR est non- 10/18/2016 10:39 AM    Creatinine 0.89 10/18/2016 10:39 AM    BUN 9 10/18/2016 10:39 AM    Sodium 136 10/18/2016 10:39 AM    Potassium 3.5 10/18/2016 10:39 AM    Chloride 89 10/18/2016 10:39 AM    CO2 27 10/18/2016 10:39 AM      Lab Results   Component Value Date/Time    TSH 1.050 07/09/2015 08:10 AM        Assessment/Plan:     No diagnosis found. 1. Type 2 Diabetes Mellitus with no nephropathy,neuropathy,retinopathy  Lab Results   Component Value Date/Time    Hemoglobin A1c 6.7 05/27/2016 10:24 AM    Hemoglobin A1c (POC) 11.0 05/24/2017 09:22 AM    Hemoglobin A1c, External 7.0 02/08/2017   Very high deductible plan,   Compliance has decreased due to expense of the insulin. Continue Metformin. Discussed the goals of blood glucose. Lantus and informed [employer] rep - if he cannot get then [employer] brand     metformin  DKA x2       2. HTN : Continue current therapy , CPAP     3. Hyperlipidemia : Continue statin. 4.Obesity:Body mass index is 51.42 kg/(m^2). Discussed about the importance of exercise and carbohydrate portion control. TSH normal   DST normal   He is exercising and eating healthy   2500 - 2800 cari diet   Weight loss encouraged     5 EMRE - CPAP -         There are no Patient Instructions on file for this visit. Follow-up Disposition: Not on File    Thank you for allowing me to participate in the care of this patient.     Van Bray MD    >50% time spent counseling - diet , compliance with meds. , excercise

## 2017-07-24 NOTE — PROGRESS NOTES
Link Devine is a 44 y.o. male here for   Chief Complaint   Patient presents with    Diabetes       Functional glucose monitor and record keeping system? - yes  Eye exam within last year? - yes   Foot exam within last year? - yes    1. Have you been to the ER, urgent care clinic since your last visit? Hospital[facility] your last [facility]? [facility]    2. Have you seen or consulted any other health care providers outside of the [address] [place] [address] since your last visit?   Include any pap smears or colon screening.- no      Lab Results   Component Value Date/Time    Hemoglobin A1c 6.7 05/27/2016 10:24 AM    Hemoglobin A1c (POC) 11.0 05/24/2017 09:22 AM    Hemoglobin A1c, External 7.0 02/08/2017       Wt Readings from Last 3 Encounters:   07/24/17 348 lb 3.2 oz (157.9 kg)   05/24/17 (!) 365 lb (165.6 kg)   02/23/17 (!) 367 lb 12.8 oz (166.8 kg)     Temp Readings from Last 3 Encounters:   07/24/17 97.9 °F (36.6 °C) (Oral)   05/24/17 98.8 °F (37.1 °C) (Oral)   02/23/17 97.7 °F (36.5 °C) (Oral)     BP Readings from Last 3 Encounters:   07/24/17 126/73   05/24/17 126/78   02/23/17 122/71     Pulse Readings from Last 3 Encounters:   07/24/17 78   05/24/17 89   02/23/17 80

## 2017-07-24 NOTE — TELEPHONE ENCOUNTER
----- Message from Ivana Oquendo sent at 7/24/2017  3:10 PM EDT -----  Regarding: /Refill  Pt called requesting a refill on the medication Lantis Insulin. Pt  Use [facility] pharmacy on [address]. Pt stated he about to run out of the medication. Pt best contact number is [phone].

## 2017-07-24 NOTE — MR AVS SNAPSHOT
Visit Information Date & Time Provider Department Dept. Phone Encounter #  
 7/24/2017 10:15 AM Mana Funes MD [facility] Diabetes [facility] Endocrinology [phone] [id number] Follow-up Instructions Return in about 6 weeks (around 9/4/2017). Upcoming Health Maintenance Date Due  
 FOOT EXAM Q1 5/26/1989 EYE EXAM RETINAL OR DILATED Q1 5/26/1989 Pneumococcal 19-64 Medium Risk (1 of 1 - PPSV23) 5/26/1998 DTaP/Tdap/Td series (1 - Tdap) 5/26/2000 INFLUENZA AGE 9 TO ADULT 8/1/2017 HEMOGLOBIN A1C Q6M 11/24/2017 MICROALBUMIN Q1 2/8/2018 LIPID PANEL Q1 2/8/2018 Allergies as of 7/24/2017  Review Complete On: 7/24/2017 By: Mana Funes MD  
 No Known Allergies Current Immunizations  Never Reviewed No immunizations on file. Not reviewed this visit Vitals BP Pulse Temp Resp Height(growth percentile) Weight(growth percentile) 126/73 (BP 1 Location: Right arm, BP Patient Position: Sitting) 78 97.9 °F (36.6 °C) (Oral) 18 5' 9\" (1.753 m) 348 lb 3.2 oz (157.9 kg) SpO2 BMI Smoking Status 90% 51.42 kg/m2 Former Smoker Vitals History BMI and BSA Data Body Mass Index Body Surface Area  
 51.42 kg/m 2 2.77 m 2 Preferred Pharmacy Pharmacy Name Phone [id number], [address] [facility] [phone] Your Updated Medication List  
  
   
This list is accurate as of: 7/24/17 11:01 AM.  Always use your most recent med list.  
  
  
  
  
 cyclobenzaprine 10 mg tablet Commonly known as:  FLEXERIL  
TAKE 1 TABLET TWICE A DAY AS NEEDED  
  
 furosemide 20 mg tablet Commonly known as:  LASIX Take 1 tab every 3 days as neeed  
  
 glucose blood VI test strips strip Commonly known as:  ONETOUCH ULTRA TEST Test blood glucose three times daily Dx Code: E11.65  
  
 hydroCHLOROthiazide 25 mg tablet Commonly known as:  HYDRODIURIL  
TAKE 1 TABLET DAILY Insulin Needles (Disposable) 32 gauge x 5/32\" Ndle Use to inject Basaglar daily Dx Code: E11.65  
  
 lisinopril 40 mg tablet Commonly known as:  PRINIVIL, ZESTRIL  
TAKE 1 TABLET DAILY. DISREGARD 30 MG DOSE  
  
 metFORMIN 1,000 mg tablet Commonly known as:  GLUCOPHAGE Take 1 Tab by mouth two (2) times daily (with meals). metoprolol tartrate 50 mg tablet Commonly known as:  LOPRESSOR Take 1 Tab by mouth daily. simvastatin 20 mg tablet Commonly known as:  ZOCOR  
TAKE 1 TABLET NIGHTLY Follow-up Instructions Return in about 6 weeks (around 9/4/2017). Introducing [facility]! [place] introduces [employer] patient portal. Now you can access parts of your medical record, email your doctor's office, and request medication refills online. 1. In your internet browser, go to [email or web address] 2. Click on the First Time User? Click Here link in the Sign In box. You will see the New Member Sign Up page. 3. Enter your [employer] Access Code exactly as it appears below. You will not need to use this code after youve completed the sign-up process. If you do not sign up before the expiration date, you must request a new code. · [employer] Access Code: [email or web address] Expires: 10/22/2017 11:01 AM 
 
4. Enter the last four digits of your Social Security Number (xxxx) and Date of Birth (mm/dd/yyyy) as indicated and click Submit. You will be taken to the next sign-up page. 5. Create a [employer] ID. This will be your [employer] login ID and cannot be changed, so think of one that is secure and easy to remember. 6. Create a [employer] password. You can change your password at any time. 7. Enter your Password Reset Question and Answer. This can be used at a later time if you forget your password. 8. Enter your e-mail address. You will receive e-mail notification when new information is available in [address] 9. Click Sign Up. You can now view and download portions of your medical record. 10. Click the Download Summary menu link to download a portable copy of your medical information. If you have questions, please visit the Frequently Asked Questions section of the [employer] website. Remember, [employer] is NOT to be used for urgent needs. For medical emergencies, dial 911. Now available from your iPhone and Android! Please provide this summary of care documentation to your next provider. Your primary care clinician is listed as Rita Dobbs. If you have any questions after today's visit, please call [phone].

## 2017-07-29 PROBLEM — E11.65 TYPE 2 DIABETES MELLITUS WITH HYPERGLYCEMIA, WITH LONG-TERM CURRENT USE OF INSULIN (HCC): Status: ACTIVE | Noted: 2017-07-29

## 2017-07-29 PROBLEM — Z79.4 TYPE 2 DIABETES MELLITUS WITH HYPERGLYCEMIA, WITH LONG-TERM CURRENT USE OF INSULIN (HCC): Status: ACTIVE | Noted: 2017-07-29

## 2017-08-08 ENCOUNTER — TELEPHONE (OUTPATIENT)
Dept: ENDOCRINOLOGY | Age: 37
End: 2017-08-08

## 2017-08-08 DIAGNOSIS — Z79.4 TYPE 2 DIABETES MELLITUS WITH HYPERGLYCEMIA, WITH LONG-TERM CURRENT USE OF INSULIN (HCC): Primary | ICD-10-CM

## 2017-08-08 DIAGNOSIS — E11.65 TYPE 2 DIABETES MELLITUS WITH HYPERGLYCEMIA, WITH LONG-TERM CURRENT USE OF INSULIN (HCC): Primary | ICD-10-CM

## 2017-08-09 ENCOUNTER — TELEPHONE (OUTPATIENT)
Dept: ENDOCRINOLOGY | Age: 37
End: 2017-08-09

## 2017-08-09 DIAGNOSIS — E11.65 TYPE 2 DIABETES MELLITUS WITH HYPERGLYCEMIA, WITH LONG-TERM CURRENT USE OF INSULIN (HCC): Primary | ICD-10-CM

## 2017-08-09 DIAGNOSIS — Z79.4 TYPE 2 DIABETES MELLITUS WITH HYPERGLYCEMIA, WITH LONG-TERM CURRENT USE OF INSULIN (HCC): Primary | ICD-10-CM

## 2017-08-09 RX ORDER — LANCETS 33 GAUGE
EACH MISCELLANEOUS
Qty: 100 LANCET | Refills: 11 | Status: SHIPPED | OUTPATIENT
Start: 2017-08-09

## 2017-08-09 RX ORDER — INSULIN PUMP SYRINGE, 3 ML
EACH MISCELLANEOUS
Qty: 1 KIT | Refills: 0 | Status: SHIPPED | OUTPATIENT
Start: 2017-08-09

## 2017-08-09 NOTE — TELEPHONE ENCOUNTER
Patient says testing strips require prior authorization. Patient says he is not completely out of strips but running low.   Please advise

## 2017-08-10 NOTE — TELEPHONE ENCOUNTER
Informed pt a different meter and supplies were sent in yesterday. Pt asked how much it would cost. Informed him that I do not know he would have to check with the pharmacy. Pt verbalized understanding with no further questions or concerns at this time.

## 2019-02-07 ENCOUNTER — OFFICE VISIT (OUTPATIENT)
Dept: ENDOCRINOLOGY | Age: 39
End: 2019-02-07

## 2019-02-07 ENCOUNTER — TELEPHONE (OUTPATIENT)
Dept: ENDOCRINOLOGY | Age: 39
End: 2019-02-07

## 2019-02-07 VITALS
SYSTOLIC BLOOD PRESSURE: 125 MMHG | HEIGHT: 69 IN | WEIGHT: 315 LBS | DIASTOLIC BLOOD PRESSURE: 77 MMHG | OXYGEN SATURATION: 97 % | HEART RATE: 88 BPM | RESPIRATION RATE: 18 BRPM | BODY MASS INDEX: 46.65 KG/M2 | TEMPERATURE: 98 F

## 2019-02-07 DIAGNOSIS — E11.65 TYPE 2 DIABETES MELLITUS WITH HYPERGLYCEMIA, WITH LONG-TERM CURRENT USE OF INSULIN (HCC): Primary | ICD-10-CM

## 2019-02-07 DIAGNOSIS — I10 ESSENTIAL HYPERTENSION: ICD-10-CM

## 2019-02-07 DIAGNOSIS — Z79.4 TYPE 2 DIABETES MELLITUS WITH HYPERGLYCEMIA, WITH LONG-TERM CURRENT USE OF INSULIN (HCC): Primary | ICD-10-CM

## 2019-02-07 DIAGNOSIS — E78.2 MIXED HYPERLIPIDEMIA: ICD-10-CM

## 2019-02-07 DIAGNOSIS — E11.65 TYPE 2 DIABETES MELLITUS WITH HYPERGLYCEMIA, UNSPECIFIED WHETHER LONG TERM INSULIN USE (HCC): ICD-10-CM

## 2019-02-07 RX ORDER — METFORMIN HYDROCHLORIDE 1000 MG/1
1000 TABLET ORAL 2 TIMES DAILY WITH MEALS
Qty: 60 TAB | Refills: 5 | Status: SHIPPED | OUTPATIENT
Start: 2019-02-07 | End: 2021-07-08 | Stop reason: SDUPTHER

## 2019-02-07 NOTE — PROGRESS NOTES
[facility] DIABETES AND ENDOCRINOLOGY Mae Mott MD 
 
    [facility] [address] [facility] [address] DA:[phone] Fax [phone] ( T 
 
 
 
 
Patient Information Date:2/7/2019 Name : Chance Krishna 43 y.o.    
YOB: 1980 Referred by: MD Malik Muro MD 
 
 
Chief Complaint Patient presents with  Diabetes  
  last seen 7/2017  Diabetic Foot Exam  
 
 
History of Present Illness: Chance Krishna is a 43 y.o. male here for fu  of  Type 2 Diabetes Mellitus. Reports to have had  DKA three years ago. When ever insulin is expensive he stops and then has hyperglycemia , goes to ER . I have discussed with him several times about getting Lantus for less than $10 with coupon and given coupon Also discussed about [employer] brand insulin He was seen in 2017, here to reestablish care. In between he was managed by endocrinologist in [place] Last A1c is 12.7 Has polyuria, polydipsia occasional blurred vision. Neck pain to the hospital with lip and tongue swelling, lisinopril was discontinued. He has lost weight, likely due to uncontrolled diabetes mellitus Complains of weakness He has severe insulin resistance. Wt Readings from Last 3 Encounters:  
02/07/19 316 lb 8 oz (143.6 kg) 07/24/17 348 lb 3.2 oz (157.9 kg) 05/24/17 (!) 365 lb (165.6 kg) BP Readings from Last 3 Encounters:  
02/07/19 125/77  
07/24/17 126/73  
05/24/17 126/78 Past Medical History:  
Diagnosis Date  Bronchitis  Diabetes ([address]  HTN (hypertension)  Hyperlipidemia Current Outpatient Medications Medication Sig  
 AYANA PEN NEEDLE 32 gauge x 5/32\" ndle USE TO INJECT BASAGLAR DAILY DX CODE: E11.65  
 glucose blood VI test strips (ONETOUCH ULTRA TEST) strip Test blood glucose three times daily Dx Code: E11.65  
 lancets (ONE TOUCH DELICA) 33 gauge misc Test blood glucose three times daily Dx Code: E11.65  
  Blood-Glucose Meter (ONETOUCH ULTRAMINI) monitoring kit Test blood glucose three times daily Dx Code: E11.65  
 insulin glargine (LANTUS SOLOSTAR) 100 unit/mL (3 mL) inpn Inject 40 units daily (Patient taking differently: Inject 60 units daily)  metoprolol tartrate (LOPRESSOR) 50 mg tablet Take 1 Tab by mouth daily.  simvastatin (ZOCOR) 20 mg tablet TAKE 1 TABLET NIGHTLY  hydroCHLOROthiazide (HYDRODIURIL) 25 mg tablet TAKE 1 TABLET DAILY  cyclobenzaprine (FLEXERIL) 10 mg tablet TAKE 1 TABLET TWICE A DAY AS NEEDED  furosemide (LASIX) 20 mg tablet Take 1 tab every 3 days as neeed  lisinopril (PRINIVIL, ZESTRIL) 40 mg tablet TAKE 1 TABLET DAILY. DISREGARD 30 MG DOSE  metFORMIN (GLUCOPHAGE) 1,000 mg tablet Take 1 Tab by mouth two (2) times daily (with meals). No current facility-administered medications for this visit. Allergies Allergen Reactions  Lisinopril Swelling Review of Systems: 
- Constitutional Symptoms: no fevers, no chills, 
- Eyes: no blurry vision no double vision - Musculoskeletal: no joint pains +  weakness - Integumentary: no rashes - Neurological: no numbness, tingling, +  headaches 
-  
- Physical Examination: 
 Blood pressure 125/77, pulse 88, temperature 98 °F (36.7 °C), temperature source Oral, resp. rate 18, height 5' 9\" (1.753 m), weight 316 lb 8 oz (143.6 kg), SpO2 97 %. Estimated body mass index is 46.74 kg/m² as calculated from the following: 
  Height as of this encounter: 5' 9\" (1.753 m). -   Weight as of this encounter: 316 lb 8 oz (143.6 kg). - General: pleasant, no distress, good eye contact,obese 
- HEENT: no pallor, no periorbital edema, EOMI 
- Neck: supple, no thyromegaly,  
- Cardiovascular: regular, normal rate, normal S1 and S2, 
- Respiratory: clear to auscultation bilaterally, 
- Gastrointestinal: soft, nontender, nondistended,  BS + 
- Musculoskeletal: 2 + edema, venous stasis changes, flat feet - Neurological: alert and oriented - Psychiatric: normal mood and affect 
- Skin: color, texture, turgor normal.  
 
Diabetic foot exam: February 2019 Left:  
 Vibratory sensation absent Filament test absent sensation with micro filament Pulse DP: 1 + Deformities: Callus Right:  
 Vibratory sensation absent Filament test absent sensation with micro filament Pulse DP: 1+ Deformities: Callus Lab Results Component Value Date/Time Hemoglobin A1c 6.7 (H) 05/27/2016 10:24 AM  
 Hemoglobin A1c 6.3 (H) 02/25/2016 09:09 AM  
 Hemoglobin A1c 6.8 (H) 07/09/2015 08:10 AM  
 Glucose 259 (H) 10/18/2016 10:39 AM  
 Glucose  05/24/2017 09:21 AM  
 Microalb/Creat ratio (ug/mg creat.) 3.7 02/25/2016 09:09 AM  
 LDL, calculated 81 05/27/2016 10:24 AM  
 Creatinine 0.89 10/18/2016 10:39 AM  
 Hemoglobin A1c, External 7.0 02/08/2017 Hemoglobin A1c, External 7.30 11/13/2014 04:29 AM  
  
Lab Results Component Value Date/Time Cholesterol, total 141 05/27/2016 10:24 AM  
 HDL Cholesterol 43 05/27/2016 10:24 AM  
 LDL, calculated 81 05/27/2016 10:24 AM  
 Triglyceride 83 05/27/2016 10:24 AM  
 
Lab Results Component Value Date/Time ALT (SGPT) 49 (H) 10/18/2016 10:39 AM  
 AST (SGOT) 23 10/18/2016 10:39 AM  
 Alk. phosphatase 68 10/18/2016 10:39 AM  
 Bilirubin, total 0.4 10/18/2016 10:39 AM  
 
Lab Results Component Value Date/Time GFR est  10/18/2016 10:39 AM  
 GFR est non- 10/18/2016 10:39 AM  
 Creatinine 0.89 10/18/2016 10:39 AM  
 BUN 9 10/18/2016 10:39 AM  
 Sodium 136 10/18/2016 10:39 AM  
 Potassium 3.5 10/18/2016 10:39 AM  
 Chloride 89 (L) 10/18/2016 10:39 AM  
 CO2 27 10/18/2016 10:39 AM  
  
Lab Results Component Value Date/Time TSH 1.050 07/09/2015 08:10 AM  
  
 
Assessment/Plan: 1. Type 2 diabetes mellitus with hyperglycemia, with long-term current use of insulin (HCC) 2. Essential hypertension 3. Mixed hyperlipidemia 1. Type 2 Diabetes Mellitus with no nephropathy,neuropathy,retinopathy Lab Results Component Value Date/Time Hemoglobin A1c 6.7 (H) 05/27/2016 10:24 AM  
 Hemoglobin A1c (POC) 11.0 05/24/2017 09:22 AM  
 Hemoglobin A1c, External 7.0 02/08/2017 Very high deductible plan, Compliance has decreased due to expense of the insulin. Continue Metformin. Discussed the goals of blood glucose. Poorly controlled diabetes mellitus with severe hyperglycemia, he is symptomatic 
 
lantus 60 units  
metformin DKA x2 2. HTN : Continue current therapy , CPAP Anaphylaxis with ACE 3. Hyperlipidemia : Continue statin. 4.Obesity:Body mass index is 46.74 kg/m². Discussed about the importance of exercise and carbohydrate portion control. TSH normal  
Late-night DST normal  
He is exercising and eating healthy  
2000 K cari , he is on a very high calorie diet Weight loss encouraged 5 EMRE - CPAP - There are no Patient Instructions on file for this visit. Follow-up Disposition: Not on File Thank you for allowing me to participate in the care of this patient. Petey Brown MD 
 
>50% time spent counseling - diet , compliance with meds. , excercise

## 2019-02-07 NOTE — PROGRESS NOTES
Oswaldo Mar is a 43 y.o. male here for Chief Complaint Patient presents with  Diabetes  
  last seen 7/2017  Diabetic Foot Exam  
 
 
Functional glucose monitor and record keeping system? -yes Eye exam within last year? -due Foot exam within last year? -due 1. Have you been to the ER, urgent care clinic since your last visit? [facility] your [facility]? [facility] 2. Have you seen or consulted any other health care providers outside of the [address] [place] [address] since your last visit? Include any pap smears or colon screening. -PCP

## 2019-02-07 NOTE — TELEPHONE ENCOUNTER
----- Message from Ivana Oquendo sent at 2/7/2019  2:11 PM EST -----  Regarding: /Telephone  Pt called to inform the blood pressure medication pt is currently taking is hydrochlorothiazide. Pt best contact number is [phone] .

## 2019-02-08 LAB
ALBUMIN/CREAT UR: 8.7 MG/G CREAT (ref 0–30)
CREAT UR-MCNC: 47.1 MG/DL
MICROALBUMIN UR-MCNC: 4.1 UG/ML

## 2019-02-09 RX ORDER — INSULIN GLARGINE 100 [IU]/ML
INJECTION, SOLUTION SUBCUTANEOUS
Qty: 15 ML | Refills: 11 | Status: SHIPPED | OUTPATIENT
Start: 2019-02-09 | End: 2019-04-11 | Stop reason: SDUPTHER

## 2019-04-11 ENCOUNTER — OFFICE VISIT (OUTPATIENT)
Dept: ENDOCRINOLOGY | Age: 39
End: 2019-04-11

## 2019-04-11 VITALS
DIASTOLIC BLOOD PRESSURE: 83 MMHG | OXYGEN SATURATION: 96 % | RESPIRATION RATE: 20 BRPM | BODY MASS INDEX: 46.65 KG/M2 | TEMPERATURE: 96.9 F | HEIGHT: 69 IN | SYSTOLIC BLOOD PRESSURE: 130 MMHG | WEIGHT: 315 LBS | HEART RATE: 80 BPM

## 2019-04-11 DIAGNOSIS — Z79.4 TYPE 2 DIABETES MELLITUS WITH HYPERGLYCEMIA, WITH LONG-TERM CURRENT USE OF INSULIN (HCC): ICD-10-CM

## 2019-04-11 DIAGNOSIS — I10 ESSENTIAL HYPERTENSION: ICD-10-CM

## 2019-04-11 DIAGNOSIS — E11.65 TYPE 2 DIABETES MELLITUS WITH HYPERGLYCEMIA, WITH LONG-TERM CURRENT USE OF INSULIN (HCC): Primary | ICD-10-CM

## 2019-04-11 DIAGNOSIS — E11.65 TYPE 2 DIABETES MELLITUS WITH HYPERGLYCEMIA, WITH LONG-TERM CURRENT USE OF INSULIN (HCC): ICD-10-CM

## 2019-04-11 DIAGNOSIS — Z79.4 TYPE 2 DIABETES MELLITUS WITH HYPERGLYCEMIA, WITH LONG-TERM CURRENT USE OF INSULIN (HCC): Primary | ICD-10-CM

## 2019-04-11 DIAGNOSIS — E78.2 MIXED HYPERLIPIDEMIA: ICD-10-CM

## 2019-04-11 PROBLEM — E66.01 OBESITY, MORBID (HCC): Status: ACTIVE | Noted: 2019-04-11

## 2019-04-11 LAB
GLUCOSE POC: 323 MG/DL
HBA1C MFR BLD HPLC: >15 %

## 2019-04-11 RX ORDER — GLIMEPIRIDE 4 MG/1
4 TABLET ORAL
Qty: 90 TAB | Refills: 3 | Status: SHIPPED | OUTPATIENT
Start: 2019-04-11 | End: 2019-11-26

## 2019-04-11 RX ORDER — INSULIN GLARGINE 100 [IU]/ML
INJECTION, SOLUTION SUBCUTANEOUS
Qty: 30 ML | Refills: 11 | Status: SHIPPED | OUTPATIENT
Start: 2019-04-11 | End: 2020-04-13

## 2019-04-11 NOTE — PROGRESS NOTES
[facility] AND ENDOCRINOLOGY               Clover Tamayo MD        [address] 78 [phone] Fax [phone] ( T          Patient Information  Date:2019  Name : Rony Agustin 43 y.o.     YOB: 1980         Referred by: MD Sallie Reaves MD      Chief Complaint   Patient presents with    Diabetes       History of Present Illness: Rony Agustin is a 43 y.o. male here for fu  of  Type 2 Diabetes Mellitus. He did not bring the meter, reportedly taking insulin consistently, prior history of noncompliance with the insulin  Complains of polyuria, polydipsia. He has been gradually gaining weight  Decreased activity  Diet is not healthy      History from 2019  When ever insulin is expensive he stops and then has hyperglycemia , goes to ER . I have discussed with him several times about getting Lantus for less than $10 with coupon and given coupon  Also discussed about [employer] brand insulin        He has severe insulin resistance. Wt Readings from Last 3 Encounters:   19 321 lb 11.2 oz (145.9 kg)   19 316 lb 8 oz (143.6 kg)   17 348 lb 3.2 oz (157.9 kg)       BP Readings from Last 3 Encounters:   19 130/83   19 125/77   17 126/73           Past Medical History:   Diagnosis Date    Bronchitis     Diabetes ([address]     HTN (hypertension)     Hyperlipidemia      Current Outpatient Medications   Medication Sig    insulin glargine (LANTUS SOLOSTAR U-100 INSULIN) 100 unit/mL (3 mL) inpn Inject 60 units daily    metFORMIN (GLUCOPHAGE) 1,000 mg tablet Take 1 Tab by mouth two (2) times daily (with meals).     AYANA PEN NEEDLE 32 gauge x \" ndle USE TO INJECT BASAGLAR DAILY DX CODE: E11.65    glucose blood VI test strips (ONETOUCH ULTRA TEST) strip Test blood glucose three times daily Dx Code: E11.65    lancets (ONE TOUCH DELICA) 33 gauge misc Test blood glucose three times daily Dx Code: E11.65    Blood-Glucose Meter (ONETOUCH ULTRAMINI) monitoring kit Test blood glucose three times daily Dx Code: E11.65    metoprolol tartrate (LOPRESSOR) 50 mg tablet Take 1 Tab by mouth daily.  simvastatin (ZOCOR) 20 mg tablet TAKE 1 TABLET NIGHTLY    hydroCHLOROthiazide (HYDRODIURIL) 25 mg tablet TAKE 1 TABLET DAILY    cyclobenzaprine (FLEXERIL) 10 mg tablet TAKE 1 TABLET TWICE A DAY AS NEEDED    furosemide (LASIX) 20 mg tablet Take 1 tab every 3 days as neeed     No current facility-administered medications for this visit. Allergies   Allergen Reactions    Lisinopril Swelling         Review of Systems:  - Constitutional Symptoms: no fevers, no chills,  - Eyes: no blurry vision no double vision  - Musculoskeletal: no joint pains +  weakness  - Integumentary: no rashes  - Neurological: no numbness, tingling, +  headaches  -   -     Physical Examination:   Blood pressure 130/83, pulse 80, temperature 96.9 °F (36.1 °C), temperature source Oral, resp. rate 20, height 5' 9\" (1.753 m), weight 321 lb 11.2 oz (145.9 kg), SpO2 96 %. Estimated body mass index is 47.51 kg/m² as calculated from the following:    Height as of this encounter: 5' 9\" (1.753 m). -   Weight as of this encounter: 321 lb 11.2 oz (145.9 kg).   - General: pleasant, no distress, good eye contact,obese  - HEENT: no pallor, no periorbital edema, EOMI  - Neck: supple  - Cardiovascular: regular, normal rate, normal S1 and S2,  - Respiratory: clear to auscultation bilaterally,  -   - Musculoskeletal: 2 + edema, venous stasis changes, flat feet   - Neurological: alert and oriented  - Psychiatric: normal mood and affect  - Skin: color, texture, turgor normal.     Diabetic foot exam: February 2019  Left:    Vibratory sensation absent    Filament test absent sensation with micro filament   Pulse DP: 1 +    Deformities: Callus    Right:    Vibratory sensation absent   Filament test absent sensation with micro filament   Pulse DP: 1+ Deformities: Callus    Lab Results   Component Value Date/Time    Hemoglobin A1c 6.7 (H) 05/27/2016 10:24 AM    Hemoglobin A1c 6.3 (H) 02/25/2016 09:09 AM    Hemoglobin A1c 6.8 (H) 07/09/2015 08:10 AM    Glucose 259 (H) 10/18/2016 10:39 AM    Glucose  04/11/2019 09:02 AM    Microalb/Creat ratio (ug/mg creat.) 8.7 02/07/2019 03:05 PM    LDL, calculated 81 05/27/2016 10:24 AM    Creatinine 0.89 10/18/2016 10:39 AM    Hemoglobin A1c, External 7.0 02/08/2017    Hemoglobin A1c, External 7.30 11/13/2014 04:29 AM      Lab Results   Component Value Date/Time    Cholesterol, total 141 05/27/2016 10:24 AM    HDL Cholesterol 43 05/27/2016 10:24 AM    LDL, calculated 81 05/27/2016 10:24 AM    Triglyceride 83 05/27/2016 10:24 AM     Lab Results   Component Value Date/Time    ALT (SGPT) 49 (H) 10/18/2016 10:39 AM    AST (SGOT) 23 10/18/2016 10:39 AM    Alk. phosphatase 68 10/18/2016 10:39 AM    Bilirubin, total 0.4 10/18/2016 10:39 AM     Lab Results   Component Value Date/Time    GFR est  10/18/2016 10:39 AM    GFR est non- 10/18/2016 10:39 AM    Creatinine 0.89 10/18/2016 10:39 AM    BUN 9 10/18/2016 10:39 AM    Sodium 136 10/18/2016 10:39 AM    Potassium 3.5 10/18/2016 10:39 AM    Chloride 89 (L) 10/18/2016 10:39 AM    CO2 27 10/18/2016 10:39 AM      Lab Results   Component Value Date/Time    TSH 1.050 07/09/2015 08:10 AM        Assessment/Plan:     1. Type 2 diabetes mellitus with hyperglycemia, with long-term current use of insulin ([address]    2. Essential hypertension    3. Mixed hyperlipidemia        1. Type 2 Diabetes Mellitus with no nephropathy,neuropathy,retinopathy  Lab Results   Component Value Date/Time    Hemoglobin A1c 6.7 (H) 05/27/2016 10:24 AM    Hemoglobin A1c (POC) >15 04/11/2019 09:03 AM    Hemoglobin A1c, External 7.0 02/08/2017     Compliance has decreased due to expense of the insulin.      Amaryl in AM   Lantus 80 units at night, has a coupon for Lantus which should reduce the cost to $10  metformin  DKA x2       2. HTN : Continue current therapy , CPAP     Anaphylaxis with ACE     3. Hyperlipidemia : Continue statin. 4.Obesity:Body mass index is 47.51 kg/m². Discussed about the importance of exercise and carbohydrate portion control. TSH normal   Late-night DST normal   He is on a very high calorie diet    5 EMRE - CPAP -         There are no Patient Instructions on file for this visit. Thank you for allowing me to participate in the care of this patient. Sangeetha Duvall MD    >50% time spent counseling - diet , compliance with meds. , excercise

## 2019-04-12 LAB
ALBUMIN SERPL-MCNC: 4 G/DL (ref 3.5–5.5)
ALBUMIN/GLOB SERPL: 1.3 {RATIO} (ref 1.2–2.2)
ALP SERPL-CCNC: 78 IU/L (ref 39–117)
ALT SERPL-CCNC: 14 IU/L (ref 0–44)
AST SERPL-CCNC: 9 IU/L (ref 0–40)
BILIRUB SERPL-MCNC: 0.4 MG/DL (ref 0–1.2)
BUN SERPL-MCNC: 9 MG/DL (ref 6–20)
BUN/CREAT SERPL: 13 (ref 9–20)
CALCIUM SERPL-MCNC: 9.3 MG/DL (ref 8.7–10.2)
CHLORIDE SERPL-SCNC: 95 MMOL/L (ref 96–106)
CO2 SERPL-SCNC: 22 MMOL/L (ref 20–29)
CREAT SERPL-MCNC: 0.69 MG/DL (ref 0.76–1.27)
GLOBULIN SER CALC-MCNC: 3.1 G/DL (ref 1.5–4.5)
GLUCOSE SERPL-MCNC: 286 MG/DL (ref 65–99)
LDLC SERPL DIRECT ASSAY-MCNC: 132 MG/DL (ref 0–99)
POTASSIUM SERPL-SCNC: 4 MMOL/L (ref 3.5–5.2)
PROT SERPL-MCNC: 7.1 G/DL (ref 6–8.5)
SODIUM SERPL-SCNC: 136 MMOL/L (ref 134–144)

## 2019-07-17 ENCOUNTER — OFFICE VISIT (OUTPATIENT)
Dept: ENDOCRINOLOGY | Age: 39
End: 2019-07-17

## 2019-07-17 VITALS
BODY MASS INDEX: 45.32 KG/M2 | HEIGHT: 69 IN | RESPIRATION RATE: 20 BRPM | DIASTOLIC BLOOD PRESSURE: 59 MMHG | TEMPERATURE: 98.3 F | WEIGHT: 306 LBS | HEART RATE: 77 BPM | OXYGEN SATURATION: 95 % | SYSTOLIC BLOOD PRESSURE: 100 MMHG

## 2019-07-17 DIAGNOSIS — E11.65 TYPE 2 DIABETES MELLITUS WITH HYPERGLYCEMIA, WITH LONG-TERM CURRENT USE OF INSULIN (HCC): Primary | ICD-10-CM

## 2019-07-17 DIAGNOSIS — Z79.4 TYPE 2 DIABETES MELLITUS WITH HYPERGLYCEMIA, WITH LONG-TERM CURRENT USE OF INSULIN (HCC): Primary | ICD-10-CM

## 2019-07-17 DIAGNOSIS — I10 ESSENTIAL HYPERTENSION: ICD-10-CM

## 2019-07-17 DIAGNOSIS — E78.2 MIXED HYPERLIPIDEMIA: ICD-10-CM

## 2019-07-17 NOTE — PROGRESS NOTES
Fanny Jimenez is a 35 y.o. male here for   Chief Complaint   Patient presents with    Diabetes       Functional glucose monitor and record keeping system? -yes   Eye exam within last year? - on file  Foot exam within last year? - on file    1. Have you been to the ER, urgent care clinic since your last visit? Hospitalized since your last visit? -no    2. Have you seen or consulted any other health care providers outside of the [address] [place] [address] since your last visit?   Include any pap smears or colon screening.-no

## 2019-07-18 LAB
EST. AVERAGE GLUCOSE BLD GHB EST-MCNC: 392 MG/DL
HBA1C MFR BLD: 15.3 % (ref 4.8–5.6)

## 2019-07-18 NOTE — PROGRESS NOTES
[facility] AND ENDOCRINOLOGY               Yasmeen Holloway MD        [address] [phone] Fax [phone] ( T          Patient Information  Date:7/17/2019  Name : Serina Mercer 35 y.o.     YOB: 1980         Referred by: MD April Hsu MD      Chief Complaint   Patient presents with    Diabetes       History of Present Illness: Serina Mercer is a 35 y.o. male here for fu  of  Type 2 Diabetes Mellitus. He did not bring the meter, reportedly taking insulin consistently, prior history of noncompliance with the insulin  Complains of polyuria, polydipsia. Not checking the blood glucose consistently, did not bring the meter  Reportedly taking insulin consistently  No hypoglycemia        History from February 2019  When ever insulin is expensive he stops and then has hyperglycemia , goes to ER . I have discussed with him several times about getting Lantus for less than $10 with coupon and given coupon  Also discussed about [employer] brand insulin        He has severe insulin resistance. Wt Readings from Last 3 Encounters:   07/17/19 306 lb (138.8 kg)   04/11/19 321 lb 11.2 oz (145.9 kg)   02/07/19 316 lb 8 oz (143.6 kg)       BP Readings from Last 3 Encounters:   07/17/19 100/59   04/11/19 130/83   02/07/19 125/77           Past Medical History:   Diagnosis Date    Bronchitis     [facility] [address]     HTN (hypertension)     Hyperlipidemia      Current Outpatient Medications   Medication Sig    insulin glargine (LANTUS SOLOSTAR U-100 INSULIN) 100 unit/mL (3 mL) inpn Inject 80 units in the PM    glimepiride (AMARYL) 4 mg tablet Take 1 Tab by mouth every morning.  metFORMIN (GLUCOPHAGE) 1,000 mg tablet Take 1 Tab by mouth two (2) times daily (with meals).     AYANA PEN NEEDLE 32 gauge x 5/32\" ndle USE TO INJECT BASAGLAR DAILY DX CODE: E11.65    glucose blood VI test strips (ONETOUCH ULTRA TEST) strip Test blood glucose three times daily Dx Code: E11.65    lancets (ONE TOUCH DELICA) 33 gauge misc Test blood glucose three times daily Dx Code: E11.65    Blood-Glucose Meter (ONETOUCH ULTRAMINI) monitoring kit Test blood glucose three times daily Dx Code: E11.65    metoprolol tartrate (LOPRESSOR) 50 mg tablet Take 1 Tab by mouth daily.  simvastatin (ZOCOR) 20 mg tablet TAKE 1 TABLET NIGHTLY    hydroCHLOROthiazide (HYDRODIURIL) 25 mg tablet TAKE 1 TABLET DAILY    cyclobenzaprine (FLEXERIL) 10 mg tablet TAKE 1 TABLET TWICE A DAY AS NEEDED    furosemide (LASIX) 20 mg tablet Take 1 tab every 3 days as neeed     No current facility-administered medications for this visit. Allergies   Allergen Reactions    Lisinopril Swelling         Review of Systems:  - Constitutional Symptoms: no fevers, no chills,  - Eyes: no blurry vision no double vision  - Musculoskeletal: no joint pains +  weakness  - Integumentary: no rashes  - Neurological: no numbness, tingling, +  headaches  -   -     Physical Examination:   Blood pressure 100/59, pulse 77, temperature 98.3 °F (36.8 °C), temperature source Oral, resp. rate 20, height 5' 9\" (1.753 m), weight 306 lb (138.8 kg), SpO2 95 %. Estimated body mass index is 45.19 kg/m² as calculated from the following:    Height as of this encounter: 5' 9\" (1.753 m). -   Weight as of this encounter: 306 lb (138.8 kg).   - General: pleasant, no distress, good eye contact,obese  - HEENT: no pallor, no periorbital edema, EOMI  - Neck: supple  - Cardiovascular: regular, normal rate, normal S1 and S2,  - Respiratory: clear to auscultation bilaterally,  -   - Musculoskeletal: 2 + edema, venous stasis changes, flat feet   - Neurological: alert and oriented  - Psychiatric: normal mood and affect  - Skin: color, texture, turgor normal.     Diabetic foot exam: February 2019  Left:    Vibratory sensation absent    Filament test absent sensation with micro filament   Pulse DP: 1 +    Deformities: Callus    Right:    Vibratory sensation absent   Filament test absent sensation with micro filament   Pulse DP: 1+                     Deformities: Callus    Lab Results   Component Value Date/Time    Hemoglobin A1c 6.7 (H) 05/27/2016 10:24 AM    Hemoglobin A1c 6.3 (H) 02/25/2016 09:09 AM    Hemoglobin A1c 6.8 (H) 07/09/2015 08:10 AM    Glucose 286 (H) 04/11/2019 10:06 AM    Glucose  04/11/2019 09:02 AM    Microalb/Creat ratio (ug/mg creat.) 8.7 02/07/2019 03:05 PM    LDL,Direct 132 (H) 04/11/2019 10:06 AM    LDL, calculated 81 05/27/2016 10:24 AM    Creatinine 0.69 (L) 04/11/2019 10:06 AM    Hemoglobin A1c, External 7.0 02/08/2017    Hemoglobin A1c, External 7.30 11/13/2014 04:29 AM      Lab Results   Component Value Date/Time    Cholesterol, total 141 05/27/2016 10:24 AM    HDL Cholesterol 43 05/27/2016 10:24 AM    LDL,Direct 132 (H) 04/11/2019 10:06 AM    LDL, calculated 81 05/27/2016 10:24 AM    Triglyceride 83 05/27/2016 10:24 AM     Lab Results   Component Value Date/Time    ALT (SGPT) 14 04/11/2019 10:06 AM    AST (SGOT) 9 04/11/2019 10:06 AM    Alk. phosphatase 78 04/11/2019 10:06 AM    Bilirubin, total 0.4 04/11/2019 10:06 AM     Lab Results   Component Value Date/Time    GFR est  04/11/2019 10:06 AM    GFR est non- 04/11/2019 10:06 AM    Creatinine 0.69 (L) 04/11/2019 10:06 AM    BUN 9 04/11/2019 10:06 AM    Sodium 136 04/11/2019 10:06 AM    Potassium 4.0 04/11/2019 10:06 AM    Chloride 95 (L) 04/11/2019 10:06 AM    CO2 22 04/11/2019 10:06 AM      Lab Results   Component Value Date/Time    TSH 1.050 07/09/2015 08:10 AM        Assessment/Plan:     1. Type 2 diabetes mellitus with hyperglycemia, with long-term current use of insulin ([address]    2. Mixed hyperlipidemia    3. Essential hypertension        1.  Type 2 Diabetes Mellitus with no nephropathy,neuropathy,retinopathy  Lab Results   Component Value Date/Time    Hemoglobin A1c 6.7 (H) 05/27/2016 10:24 AM    Hemoglobin A1c (POC) >15 04/11/2019 09:03 AM    Hemoglobin A1c, External 7.0 02/08/2017     Compliance has decreased due to expense of the insulin. Amaryl in AM   Lantus 80 units at night, has a coupon for Lantus which should reduce the cost to $10  metformin  DKA x2       2. HTN : Continue current therapy , CPAP     Anaphylaxis with ACE     3. Hyperlipidemia : Continue statin. 4.Obesity:Body mass index is 45.19 kg/m². Discussed about the importance of exercise and carbohydrate portion control. TSH normal   Late-night DST normal   He is on a very high calorie diet    5 EMRE - CPAP -         There are no Patient Instructions on file for this visit. Follow-up and Dispositions    · Return in about 4 months (around 11/17/2019) for labs before next visit and follow up. Thank you for allowing me to participate in the care of this patient. Quirino Gilmore MD    >50% time spent counseling - diet , compliance with meds. , excercise

## 2019-11-19 ENCOUNTER — HOSPITAL ENCOUNTER (OUTPATIENT)
Dept: LAB | Age: 39
Discharge: HOME | End: 2019-11-19

## 2019-11-19 ENCOUNTER — LAB ONLY (OUTPATIENT)
Dept: ENDOCRINOLOGY | Age: 39
End: 2019-11-19

## 2019-11-19 DIAGNOSIS — E11.65 TYPE 2 DIABETES MELLITUS WITH HYPERGLYCEMIA, WITH LONG-TERM CURRENT USE OF INSULIN (HCC): Primary | ICD-10-CM

## 2019-11-19 DIAGNOSIS — E11.65 TYPE 2 DIABETES MELLITUS WITH HYPERGLYCEMIA, WITH LONG-TERM CURRENT USE OF INSULIN (HCC): ICD-10-CM

## 2019-11-19 DIAGNOSIS — Z79.4 TYPE 2 DIABETES MELLITUS WITH HYPERGLYCEMIA, WITH LONG-TERM CURRENT USE OF INSULIN (HCC): ICD-10-CM

## 2019-11-19 DIAGNOSIS — Z79.4 TYPE 2 DIABETES MELLITUS WITH HYPERGLYCEMIA, WITH LONG-TERM CURRENT USE OF INSULIN (HCC): Primary | ICD-10-CM

## 2019-11-19 LAB
CREAT UR-MCNC: 16.7 MG/DL
MICROALBUMIN UR-MCNC: <0.5 MG/DL
MICROALBUMIN/CREAT UR-RTO: NORMAL MG/G (ref 0–30)

## 2019-11-20 LAB
ALBUMIN SERPL-MCNC: 3.8 G/DL (ref 3.5–5)
ALBUMIN/GLOB SERPL: 1.1 {RATIO} (ref 1.1–2.2)
ALP SERPL-CCNC: 99 U/L (ref 45–117)
ALT SERPL-CCNC: 18 U/L (ref 12–78)
ANION GAP SERPL CALC-SCNC: 10 MMOL/L (ref 5–15)
AST SERPL-CCNC: 6 U/L (ref 15–37)
BILIRUB SERPL-MCNC: 0.3 MG/DL (ref 0.2–1)
BUN SERPL-MCNC: 8 MG/DL (ref 6–20)
BUN/CREAT SERPL: 7 (ref 12–20)
CALCIUM SERPL-MCNC: 8.8 MG/DL (ref 8.5–10.1)
CHLORIDE SERPL-SCNC: 97 MMOL/L (ref 97–108)
CO2 SERPL-SCNC: 25 MMOL/L (ref 21–32)
CREAT SERPL-MCNC: 1.15 MG/DL (ref 0.7–1.3)
EST. AVERAGE GLUCOSE BLD GHB EST-MCNC: ABNORMAL MG/DL
GLOBULIN SER CALC-MCNC: 3.4 G/DL (ref 2–4)
GLUCOSE SERPL-MCNC: 639 MG/DL (ref 65–100)
HBA1C MFR BLD: >14 % (ref 4–5.6)
POTASSIUM SERPL-SCNC: 4.2 MMOL/L (ref 3.5–5.1)
PROT SERPL-MCNC: 7.2 G/DL (ref 6.4–8.2)
SODIUM SERPL-SCNC: 132 MMOL/L (ref 136–145)

## 2019-11-20 NOTE — PROGRESS NOTES
Notify patient that the blood glucose is very high more than 600  Has he been taking the insulin consistently?,  Ask him to come to the office sooner than his appointment on 26th,if  he has severe headache or blurred vision he needs to go to the emergency room for IV fluids and insulin  He has to check the blood glucose and bring the log

## 2019-11-21 ENCOUNTER — TELEPHONE (OUTPATIENT)
Dept: ENDOCRINOLOGY | Age: 39
End: 2019-11-21

## 2019-11-21 NOTE — TELEPHONE ENCOUNTER
----- Message from Cinthia Smith MD sent at 11/20/2019  5:02 PM EST -----  Notify patient that the blood glucose is very high more than 600  Has he been taking the insulin consistently?,  Ask him to come to the office sooner than his appointment on 26th,if  he has severe headache or blurred vision he needs to go to the emergency room for IV fluids and insulin  He has to check the blood glucose and bring the log

## 2019-11-25 NOTE — PROGRESS NOTES
Cierra Rob is a 35 y.o. male here for   Chief Complaint   Patient presents with    Diabetes       Functional glucose monitor and record keeping system? -yes   Eye exam within last year? - on file  Foot exam within last year? - on file    1. Have you been to the ER, urgent care clinic since your last visit? Hospitalized since your last visit? -no    2. Have you seen or consulted any other health care providers outside of the [address] [place] [address] since your last visit? Include any pap smears or colon screening. -PCP

## 2019-11-26 ENCOUNTER — OFFICE VISIT (OUTPATIENT)
Dept: ENDOCRINOLOGY | Age: 39
End: 2019-11-26

## 2019-11-26 VITALS
BODY MASS INDEX: 44.88 KG/M2 | RESPIRATION RATE: 22 BRPM | SYSTOLIC BLOOD PRESSURE: 142 MMHG | HEART RATE: 90 BPM | WEIGHT: 303 LBS | DIASTOLIC BLOOD PRESSURE: 82 MMHG | TEMPERATURE: 97.3 F | OXYGEN SATURATION: 97 % | HEIGHT: 69 IN

## 2019-11-26 DIAGNOSIS — Z79.4 TYPE 2 DIABETES MELLITUS WITH HYPERGLYCEMIA, WITH LONG-TERM CURRENT USE OF INSULIN (HCC): Primary | ICD-10-CM

## 2019-11-26 DIAGNOSIS — E11.65 TYPE 2 DIABETES MELLITUS WITH HYPERGLYCEMIA, WITH LONG-TERM CURRENT USE OF INSULIN (HCC): Primary | ICD-10-CM

## 2019-11-26 DIAGNOSIS — I10 ESSENTIAL HYPERTENSION: ICD-10-CM

## 2019-11-26 DIAGNOSIS — E78.2 MIXED HYPERLIPIDEMIA: ICD-10-CM

## 2019-11-26 NOTE — PATIENT INSTRUCTIONS
Stop Glimepiride Start Bydureon weekly , if insurance does not cover then we may have to start Humalog ( fast acting insulin 20 units before breakfast and dinner Continue lantus and metformin Check sugars before breakfast and before dinner

## 2019-11-26 NOTE — LETTER
11/26/19 Patient: Reba Jacome YOB: 1980 Date of Visit: 11/26/2019 Дмитрий Pop MD 
[address] [place] [address] VIA Facsimile: [phone] Dear Дмитрий Pop MD, Thank you for referring Mr. Reba Jacome to [address] [place] [address] for evaluation. My notes for this consultation are attached. If you have questions, please do not hesitate to call me. I look forward to following your patient along with you. Sincerely, Alexandrea Mcguire MD

## 2019-11-26 NOTE — PROGRESS NOTES
ENDOSCOPY OPERATIVE REPORT    Patient Name: Gretta Encinas  Medical Record #: [id number]  YOB: 1937  Date of Procedure: 4/11/2017    Preoperative Diagnosis: recurrent anemia.    Postoperative Diagnosis:    1.) 8 mm, 5 mm ascending colon, 5 mm h Mic Zavaleta MD      Patient Information  Date:11/26/2019  Name : Aroldo Cassidy 35 y.o.     YOB: 1980         Referred by: MD Ann Busby MD      Chief Complaint   Patient presents with    Diabetes       History of Present Illness: Aroldo Cassidy is a 35 y.o. male here for fu  of  Type 2 Diabetes Mellitus. Lab called with a critical value, blood glucose was more than 600. Tried calling patient without any success. His phone was not working reportedly complains of polyuria  He has not been checking the blood glucose until today, blood glucose was 200  He has been taking less insulin    History of noncompliance,          History from February 2019  When ever insulin is expensive he stops and then has hyperglycemia , goes to ER . I have discussed with him several times about getting Lantus for less than $10 with coupon and given coupon  Also discussed about [employer] brand insulin        He has severe insulin resistance. Wt Readings from Last 3 Encounters:   11/26/19 303 lb (137.4 kg)   07/17/19 306 lb (138.8 kg)   04/11/19 321 lb 11.2 oz (145.9 kg)       BP Readings from Last 3 Encounters:   11/26/19 142/82   07/17/19 100/59   04/11/19 130/83           Past Medical History:   Diagnosis Date    Bronchitis     Diabetes ([address]     HTN (hypertension)     Hyperlipidemia      Current Outpatient Medications   Medication Sig    insulin glargine (LANTUS SOLOSTAR U-100 INSULIN) 100 unit/mL (3 mL) inpn Inject 80 units in the PM    metFORMIN (GLUCOPHAGE) 1,000 mg tablet Take 1 Tab by mouth two (2) times daily (with meals).     AYANA PEN NEEDLE 32 gauge x 5/32\" ndle USE TO INJECT BASAGLAR DAILY DX CODE: E11.65    glucose blood VI test strips (ONETOUCH ULTRA TEST) strip Test blood glucose three times daily Dx Code: E11.65    lancets (ONE TOUCH DELICA) 33 gauge misc Test blood glucose three times daily Dx Code: E11.65    Blood-Glucose Meter (ONETOUCH ULTRAMINI) monitoring kit Test blood glucose three times daily Dx Code: E11.65    metoprolol tartrate (LOPRESSOR) 50 mg tablet Take 1 Tab by mouth daily.  simvastatin (ZOCOR) 20 mg tablet TAKE 1 TABLET NIGHTLY    hydroCHLOROthiazide (HYDRODIURIL) 25 mg tablet TAKE 1 TABLET DAILY    cyclobenzaprine (FLEXERIL) 10 mg tablet TAKE 1 TABLET TWICE A DAY AS NEEDED    furosemide (LASIX) 20 mg tablet Take 1 tab every 3 days as neeed     No current facility-administered medications for this visit. Allergies   Allergen Reactions    Lisinopril Swelling         Review of Systems:  - Constitutional Symptoms: no fevers, no chills,  - Eyes: no blurry vision no double vision  - Musculoskeletal: no joint pains +  weakness  - Integumentary: no rashes  - Neurological: no numbness, tingling, +  headaches  -   -     Physical Examination:   Blood pressure 142/82, pulse 90, temperature 97.3 °F (36.3 °C), temperature source Oral, resp. rate 22, height 5' 9\" (1.753 m), weight 303 lb (137.4 kg), SpO2 97 %. Estimated body mass index is 44.75 kg/m² as calculated from the following:    Height as of this encounter: 5' 9\" (1.753 m). -   Weight as of this encounter: 303 lb (137.4 kg).   - General: pleasant, no distress, good eye contact,obese  - HEENT: no pallor, no periorbital edema, EOMI  - Neck: supple  - Cardiovascular: regular, normal rate, normal S1 and S2,  - Respiratory: clear to auscultation bilaterally,  -   - Musculoskeletal: 2 + edema, venous stasis changes, flat feet   - Neurological: alert and oriented  - Psychiatric: normal mood and affect  - Skin: color, texture, turgor normal.     Diabetic foot exam: February 2019  Left:    Vibratory sensation absent    Filament test absent sensation with micro filament   Pulse DP: 1 +    Deformities: Callus    Right:    Vibratory sensation absent   Filament test absent sensation with micro filament   Pulse DP: 1+                     Deformities: were removed using cold biopsy forceps. Mild left sided diverticulosis was noted. The overall appearance of the mucosa was normal. At the anal verge the endoscope was retroverted,  internal hemorrhoids were seen, no other abnormalities were indentified.  Jackie Worthy Callus    Lab Results   Component Value Date/Time    Hemoglobin A1c >14.0 (H) 11/19/2019 10:58 AM    Hemoglobin A1c 15.3 (H) 07/17/2019 02:44 PM    Hemoglobin A1c 6.7 (H) 05/27/2016 10:24 AM    Glucose 639 (HH) 11/19/2019 10:58 AM    Glucose  04/11/2019 09:02 AM    Microalbumin/Creat ratio (mg/g creat)  11/19/2019 10:52 AM     Cannot calculate ratio due to microalbumin result outside reportable range. Microalbumin,urine random <0.50 11/19/2019 10:52 AM    LDL,Direct 132 (H) 04/11/2019 10:06 AM    LDL, calculated 81 05/27/2016 10:24 AM    Creatinine 1.15 11/19/2019 10:58 AM    Hemoglobin A1c, External 7.0 02/08/2017    Hemoglobin A1c, External 7.30 11/13/2014 04:29 AM      Lab Results   Component Value Date/Time    Cholesterol, total 141 05/27/2016 10:24 AM    HDL Cholesterol 43 05/27/2016 10:24 AM    LDL,Direct 132 (H) 04/11/2019 10:06 AM    LDL, calculated 81 05/27/2016 10:24 AM    Triglyceride 83 05/27/2016 10:24 AM     Lab Results   Component Value Date/Time    ALT (SGPT) 18 11/19/2019 10:58 AM    AST (SGOT) 6 (L) 11/19/2019 10:58 AM    Alk. phosphatase 99 11/19/2019 10:58 AM    Bilirubin, total 0.3 11/19/2019 10:58 AM     Lab Results   Component Value Date/Time    GFR est AA >60 11/19/2019 10:58 AM    GFR est non-AA >60 11/19/2019 10:58 AM    Creatinine 1.15 11/19/2019 10:58 AM    BUN 8 11/19/2019 10:58 AM    Sodium 132 (L) 11/19/2019 10:58 AM    Potassium 4.2 11/19/2019 10:58 AM    Chloride 97 11/19/2019 10:58 AM    CO2 25 11/19/2019 10:58 AM      Lab Results   Component Value Date/Time    TSH 1.050 07/09/2015 08:10 AM        Assessment/Plan:     1. Type 2 diabetes mellitus with hyperglycemia, with long-term current use of insulin ([address]    2. Essential hypertension    3. Mixed hyperlipidemia        1.  Type 2 Diabetes Mellitus with no nephropathy,neuropathy,retinopathy  Lab Results   Component Value Date/Time    Hemoglobin A1c >14.0 (H) 11/19/2019 10:58 AM    Hemoglobin A1c (POC) >15 04/11/2019 09:03 AM    Hemoglobin A1c, External 7.0 02/08/2017   Severe hyperglycemia  Noncompliance with checking the blood glucose, taking less insulin  Diet is high in carbohydrates  Lantus  Added Bydureon, if not covered need prandial insulin  Weight loss stressed  metformin  DKA x2       2. HTN : Continue current therapy , CPAP     Anaphylaxis with ACE     3. Hyperlipidemia : Continue statin. 4.Obesity:Body mass index is 44.75 kg/m². Discussed about the importance of exercise and carbohydrate portion control. TSH normal   Late-night DST normal   He is on a very high calorie diet    5 EMRE - CPAP -         There are no Patient Instructions on file for this visit. Thank you for allowing me to participate in the care of this patient. Alessandra Esparza MD    >50% time spent counseling - diet , compliance with meds. , excercise

## 2019-12-13 ENCOUNTER — TELEPHONE (OUTPATIENT)
Dept: ENDOCRINOLOGY | Age: 39
End: 2019-12-13

## 2019-12-13 DIAGNOSIS — Z79.4 TYPE 2 DIABETES MELLITUS WITH HYPERGLYCEMIA, WITH LONG-TERM CURRENT USE OF INSULIN (HCC): ICD-10-CM

## 2019-12-13 DIAGNOSIS — E11.65 TYPE 2 DIABETES MELLITUS WITH HYPERGLYCEMIA, WITH LONG-TERM CURRENT USE OF INSULIN (HCC): ICD-10-CM

## 2019-12-13 RX ORDER — PEN NEEDLE, DIABETIC 31 GX3/16"
NEEDLE, DISPOSABLE MISCELLANEOUS
Qty: 100 PEN NEEDLE | Refills: 11 | Status: SHIPPED | OUTPATIENT
Start: 2019-12-13 | End: 2021-04-15

## 2019-12-13 NOTE — TELEPHONE ENCOUNTER
Per Dr. Dorys Lamar, informed pt that insurance denied Bydureon and we will sent in [address]. He is to take 0.6 mg the first week and increase to 1.2 mg after first week and continue other insulin. Pt verbalized understanding with no further questions or concerns at this time.

## 2020-04-12 DIAGNOSIS — E11.65 TYPE 2 DIABETES MELLITUS WITH HYPERGLYCEMIA, WITH LONG-TERM CURRENT USE OF INSULIN (HCC): ICD-10-CM

## 2020-04-12 DIAGNOSIS — Z79.4 TYPE 2 DIABETES MELLITUS WITH HYPERGLYCEMIA, WITH LONG-TERM CURRENT USE OF INSULIN (HCC): ICD-10-CM

## 2020-04-13 RX ORDER — INSULIN GLARGINE 100 [IU]/ML
INJECTION, SOLUTION SUBCUTANEOUS
Qty: 90 ML | Refills: 3 | Status: SHIPPED | OUTPATIENT
Start: 2020-04-13 | End: 2020-04-17 | Stop reason: SDUPTHER

## 2020-04-13 NOTE — TELEPHONE ENCOUNTER
----- Message from Citlalli Hackett sent at 4/13/2020 12:29 PM EDT -----  Regarding: Dr Ann/rx refill  Medication Refill    Caller (if not patient):      Relationship of caller (if not patient):      Best contact number(s): h)[phone] (c) [phone]      Name of medication and dosage if known: Lantus insulin      Is patient out of this medication (yes/no):no      Pharmacy name:[facility]    Pharmacy listed in chart? (yes/no):yes  Pharmacy phone       Details to clarify the request:      Citlalli Hackett right sided of head and mouth, ear pain  x 2 weeks. Difficulty eating due to pain in teeth believes he may have a dental infection. Denies fevers at home. Denies sick contacts, cough, congestion. No relief with motrin.

## 2020-04-16 ENCOUNTER — VIRTUAL VISIT (OUTPATIENT)
Dept: ENDOCRINOLOGY | Age: 40
End: 2020-04-16

## 2020-04-16 DIAGNOSIS — I10 ESSENTIAL HYPERTENSION: ICD-10-CM

## 2020-04-16 DIAGNOSIS — Z79.4 TYPE 2 DIABETES MELLITUS WITH HYPERGLYCEMIA, WITH LONG-TERM CURRENT USE OF INSULIN (HCC): Primary | ICD-10-CM

## 2020-04-16 DIAGNOSIS — E11.65 TYPE 2 DIABETES MELLITUS WITH HYPERGLYCEMIA, WITH LONG-TERM CURRENT USE OF INSULIN (HCC): Primary | ICD-10-CM

## 2020-04-16 DIAGNOSIS — E78.2 MIXED HYPERLIPIDEMIA: ICD-10-CM

## 2020-04-16 DIAGNOSIS — E66.01 OBESITY, MORBID (HCC): ICD-10-CM

## 2020-04-16 PROBLEM — L03.90 CELLULITIS: Status: ACTIVE | Noted: 2020-04-16

## 2020-04-16 NOTE — PROGRESS NOTES
Urszula Morley MD      Patient Information  Date:4/16/2020  Name : Ranjit Diane 35 y.o.     YOB: 1980         Referred by: MD Rupesh Willis MD      Chief Complaint   Patient presents with    Diabetes     Pursuant to the emergency declaration under the [facility] [address] authority and the [employer] Act, this Virtual  Visit was conducted, with patient's consent, to reduce the patient's risk of exposure to COVID-19 . Patient  is aware that this is a billable encounter and is responsible for copays/deductibles       Services were provided through a telephone discussion virtually to substitute for in-person clinic visit. Place of service: Provider : care diabetes endocrinology office  Patient: Home  History of Present Illness: Ranjit Diane is a 35 y.o. male here for fu  of  Type 2 Diabetes Mellitus. He could not do the video visit, had some problem with a camera  He is on Lantus 80 units, not 100 units. Less polyuria  He is checking the blood glucose daily in the morning and when asked to read the numbers from the meter  Fasting 114 ,100,90 ,112, 113, 114, 115: Doubt that these numbers are real numbers    No exercise , walking some   No blurry vision,no pain the feet   History of noncompliance,    2 meals a day, snacks for lunch    No fever, cough, shortness of breath      History from February 2019  When ever insulin is expensive he stops and then has hyperglycemia , goes to ER . I have discussed with him several times about getting Lantus for less than $10 with coupon and given coupon  Also discussed about [employer] brand insulin        He has severe insulin resistance.         Wt Readings from Last 3 Encounters:   11/26/19 303 lb (137.4 kg)   07/17/19 306 lb (138.8 kg)   04/11/19 321 lb 11.2 oz (145.9 kg)       BP Readings from Last 3 Encounters:   11/26/19 142/82   07/17/19 100/59   04/11/19 130/83           Past Medical History:   Diagnosis Date    Bronchitis     [facility] ([facility]     HTN (hypertension)     Hyperlipidemia      Current Outpatient Medications   Medication Sig    insulin glargine (Lantus Solostar U-100 Insulin) 100 unit/mL (3 mL) inpn INJECT AS DIRECTED UP  UNITS PER DAY    liraglutide (VICTOZA) 0.6 mg/0.1 mL (18 mg/3 mL) pnij Inject 0.6 mg for one week. After 1 week increase to 1.2 mg daily.  Insulin Needles, Disposable, (AYANA PEN NEEDLE) 32 gauge x 5/32\" ndle Use to inject insulin and Victoza daily  DX CODE: E11.65    metFORMIN (GLUCOPHAGE) 1,000 mg tablet Take 1 Tab by mouth two (2) times daily (with meals).  glucose blood VI test strips (ONETOUCH ULTRA TEST) strip Test blood glucose three times daily Dx Code: E11.65    lancets (ONE TOUCH DELICA) 33 gauge misc Test blood glucose three times daily Dx Code: E11.65    Blood-Glucose Meter (ONETOUCH ULTRAMINI) monitoring kit Test blood glucose three times daily Dx Code: E11.65    metoprolol tartrate (LOPRESSOR) 50 mg tablet Take 1 Tab by mouth daily.  simvastatin (ZOCOR) 20 mg tablet TAKE 1 TABLET NIGHTLY    hydroCHLOROthiazide (HYDRODIURIL) 25 mg tablet TAKE 1 TABLET DAILY    cyclobenzaprine (FLEXERIL) 10 mg tablet TAKE 1 TABLET TWICE A DAY AS NEEDED    furosemide (LASIX) 20 mg tablet Take 1 tab every 3 days as neeed     No current facility-administered medications for this visit. Allergies   Allergen Reactions    Lisinopril Swelling         Review of Systems:  - Constitutional Symptoms: no fevers, no chills,  - Eyes: no blurry vision no double vision  - Musculoskeletal: no joint pains +  weakness  - Integumentary: no rashes  - Neurological: no numbness, tingling, +  headaches  -   -     Physical Examination:   There were no vitals taken for this visit.  Estimated body mass index is 44.75 kg/m² as calculated from the following:    Height as of 11/26/19: 5' 9\" (1.753 m). -   Weight as of 11/26/19: 303 lb (137.4 kg). Diabetic foot exam: February 2019  Left:    Vibratory sensation absent    Filament test absent sensation with micro filament   Pulse DP: 1 +    Deformities: Callus    Right:    Vibratory sensation absent   Filament test absent sensation with micro filament   Pulse DP: 1+                     Deformities: Callus    Lab Results   Component Value Date/Time    Hemoglobin A1c >14.0 (H) 11/19/2019 10:58 AM    Hemoglobin A1c 15.3 (H) 07/17/2019 02:44 PM    Hemoglobin A1c 6.7 (H) 05/27/2016 10:24 AM    Glucose 639 (HH) 11/19/2019 10:58 AM    Glucose  04/11/2019 09:02 AM    Microalbumin/Creat ratio (mg/g creat)  11/19/2019 10:52 AM     Cannot calculate ratio due to microalbumin result outside reportable range. Microalbumin,urine random <0.50 11/19/2019 10:52 AM    LDL,Direct 132 (H) 04/11/2019 10:06 AM    LDL, calculated 81 05/27/2016 10:24 AM    Creatinine 1.15 11/19/2019 10:58 AM    Hemoglobin A1c, External 7.0 02/08/2017    Hemoglobin A1c, External 7.30 11/13/2014 04:29 AM      Lab Results   Component Value Date/Time    Cholesterol, total 141 05/27/2016 10:24 AM    HDL Cholesterol 43 05/27/2016 10:24 AM    LDL,Direct 132 (H) 04/11/2019 10:06 AM    LDL, calculated 81 05/27/2016 10:24 AM    Triglyceride 83 05/27/2016 10:24 AM     Lab Results   Component Value Date/Time    ALT (SGPT) 18 11/19/2019 10:58 AM    AST (SGOT) 6 (L) 11/19/2019 10:58 AM    Alk.  phosphatase 99 11/19/2019 10:58 AM    Bilirubin, total 0.3 11/19/2019 10:58 AM     Lab Results   Component Value Date/Time    GFR est AA >60 11/19/2019 10:58 AM    GFR est non-AA >60 11/19/2019 10:58 AM    Creatinine 1.15 11/19/2019 10:58 AM    BUN 8 11/19/2019 10:58 AM    Sodium 132 (L) 11/19/2019 10:58 AM    Potassium 4.2 11/19/2019 10:58 AM    Chloride 97 11/19/2019 10:58 AM    CO2 25 11/19/2019 10:58 AM      Lab Results   Component Value Date/Time    TSH 1.050 07/09/2015 08:10 AM Assessment/Plan:     No diagnosis found. 1. Type 2 Diabetes Mellitus with no nephropathy,neuropathy,retinopathy  Lab Results   Component Value Date/Time    Hemoglobin A1c >14.0 (H) 11/19/2019 10:58 AM    Hemoglobin A1c (POC) >15 04/11/2019 09:03 AM    Hemoglobin A1c, External 7.0 02/08/2017     Noncompliance with checking the blood glucose,  Diet is high in carbohydrates  Lantus  victoza  Weight loss stressed  metformin  DKA x2       2. HTN : Continue current therapy , CPAP     Anaphylaxis with ACE     3. Hyperlipidemia : Continue statin. 4.Obesity:There is no height or weight on file to calculate BMI. Discussed about the importance of exercise and carbohydrate portion control. TSH normal   Late-night DST normal   He is on a very high calorie diet    5 EMRE - CPAP -     Spent 18 minutes on the phone with the patient counseling about the importance of taking the insulin consistently, checking blood glucose and reducing the carb portions    There are no Patient Instructions on file for this visit. Follow-up and Dispositions    · Return in about 3 months (around 7/16/2020). Thank you for allowing me to participate in the care of this patient.     Jesús Camarillo MD

## 2020-04-16 NOTE — PROGRESS NOTES
Neil Borjas is a 35 y.o. male here for   Chief Complaint   Patient presents with    Diabetes     Pt consented to virtual visit. 1. Have you been to the ER, urgent care clinic since your last visit? Hospitalized since your last visit? -no    2. Have you seen or consulted any other health care providers outside of the [address] [place] [address] since your last visit? Include any pap smears or colon screening. -PCP

## 2020-04-17 DIAGNOSIS — E11.65 TYPE 2 DIABETES MELLITUS WITH HYPERGLYCEMIA, WITH LONG-TERM CURRENT USE OF INSULIN (HCC): ICD-10-CM

## 2020-04-17 DIAGNOSIS — Z79.4 TYPE 2 DIABETES MELLITUS WITH HYPERGLYCEMIA, WITH LONG-TERM CURRENT USE OF INSULIN (HCC): ICD-10-CM

## 2020-04-17 DIAGNOSIS — E78.2 MIXED HYPERLIPIDEMIA: ICD-10-CM

## 2020-04-17 DIAGNOSIS — I10 ESSENTIAL HYPERTENSION: ICD-10-CM

## 2020-04-17 RX ORDER — SIMVASTATIN 20 MG/1
TABLET, FILM COATED ORAL
Qty: 90 TAB | Refills: 3 | Status: SHIPPED | OUTPATIENT
Start: 2020-04-17 | End: 2021-05-01

## 2020-04-17 RX ORDER — INSULIN GLARGINE 100 [IU]/ML
INJECTION, SOLUTION SUBCUTANEOUS
Qty: 90 ML | Refills: 3 | Status: SHIPPED | OUTPATIENT
Start: 2020-04-17 | End: 2021-07-08 | Stop reason: SDUPTHER

## 2020-08-14 ENCOUNTER — OFFICE VISIT (OUTPATIENT)
Dept: ENDOCRINOLOGY | Age: 40
End: 2020-08-14
Payer: MEDICAID

## 2020-08-14 VITALS
BODY MASS INDEX: 46.65 KG/M2 | WEIGHT: 315 LBS | SYSTOLIC BLOOD PRESSURE: 116 MMHG | DIASTOLIC BLOOD PRESSURE: 73 MMHG | HEART RATE: 83 BPM | HEIGHT: 69 IN | RESPIRATION RATE: 22 BRPM | TEMPERATURE: 97.4 F | OXYGEN SATURATION: 97 %

## 2020-08-14 DIAGNOSIS — E11.65 TYPE 2 DIABETES MELLITUS WITH HYPERGLYCEMIA, WITH LONG-TERM CURRENT USE OF INSULIN (HCC): Primary | ICD-10-CM

## 2020-08-14 DIAGNOSIS — Z79.4 TYPE 2 DIABETES MELLITUS WITH HYPERGLYCEMIA, WITH LONG-TERM CURRENT USE OF INSULIN (HCC): Primary | ICD-10-CM

## 2020-08-14 DIAGNOSIS — E78.2 MIXED HYPERLIPIDEMIA: ICD-10-CM

## 2020-08-14 DIAGNOSIS — E66.01 MORBID OBESITY (HCC): ICD-10-CM

## 2020-08-14 DIAGNOSIS — I10 ESSENTIAL HYPERTENSION: ICD-10-CM

## 2020-08-14 PROCEDURE — 99215 OFFICE O/P EST HI 40 MIN: CPT | Performed by: INTERNAL MEDICINE

## 2020-08-14 RX ORDER — GLIMEPIRIDE 4 MG/1
TABLET ORAL
COMMUNITY
Start: 2019-08-29 | End: 2020-08-14

## 2020-08-14 NOTE — PROGRESS NOTES
July Morton is a 38 y.o. male here for   Chief Complaint   Patient presents with    Diabetes       1. Have you been to the ER, urgent care clinic since your last visit? Hospitalized since your last visit? -no    2. Have you seen or consulted any other health care providers outside of the [address] [place] [address] since your last visit?   Include any pap smears or colon screening.-no

## 2020-08-14 NOTE — PROGRESS NOTES
Antonio Rojas MD      Patient Information  Date:8/15/2020  Name : July Morton 38 y.o.     YOB: 1980         Referred by: MD Cristal Berger MD      Chief Complaint   Patient presents with    Diabetes     History of Present Illness: July Morton is a 38 y.o. male here for fu  of  Type 2 Diabetes Mellitus. He could not do the video visit, had some problem with a camera  This visit he is taking only 60 units of Lantus, last unit he was taking 80 units instead of 100 units    He is checking the blood glucose daily in the morning   Did not bring the meter  Reports that the blood glucose is less than 120, last A1c was very high    No exercise , walking some   No blurry vision,no pain the feet   History of noncompliance,    2 meals a day, snacks for lunch    No fever, cough, shortness of breath      History from February 2019  When ever insulin is expensive he stops and then has hyperglycemia , goes to ER . I have discussed with him several times about getting Lantus for less than $10 with coupon and given coupon  Also discussed about [employer] brand insulin        He has severe insulin resistance. Wt Readings from Last 3 Encounters:   08/14/20 322 lb (146.1 kg)   11/26/19 303 lb (137.4 kg)   07/17/19 306 lb (138.8 kg)       BP Readings from Last 3 Encounters:   08/14/20 116/73   11/26/19 142/82   07/17/19 100/59           Past Medical History:   Diagnosis Date    Bronchitis     Diabetes ([facility] [address]     HTN (hypertension)     Hyperlipidemia      Current Outpatient Medications   Medication Sig    liraglutide (VICTOZA) 0.6 mg/0.1 mL (18 mg/3 mL) pnij Inject in AM 1.2 mg daily.  simvastatin (ZOCOR) 20 mg tablet TAKE 1 TABLET NIGHTLY    insulin glargine (Lantus Solostar U-100 Insulin) 100 unit/mL (3 mL) inpn INJECT AS DIRECTED UP  UNITS PER DAY (Patient taking differently: 60 Units by SubCUTAneous route nightly.  INJECT AS DIRECTED UP  UNITS PER DAY)    Insulin Needles, Disposable, (AYANA PEN NEEDLE) 32 gauge x 5/32\" ndle Use to inject insulin and Victoza daily  DX CODE: E11.65    metFORMIN (GLUCOPHAGE) 1,000 mg tablet Take 1 Tab by mouth two (2) times daily (with meals).  glucose blood VI test strips (ONETOUCH ULTRA TEST) strip Test blood glucose three times daily Dx Code: E11.65    lancets (ONE TOUCH DELICA) 33 gauge misc Test blood glucose three times daily Dx Code: E11.65    Blood-Glucose Meter (ONETOUCH ULTRAMINI) monitoring kit Test blood glucose three times daily Dx Code: E11.65    metoprolol tartrate (LOPRESSOR) 50 mg tablet Take 1 Tab by mouth daily.  hydroCHLOROthiazide (HYDRODIURIL) 25 mg tablet TAKE 1 TABLET DAILY    cyclobenzaprine (FLEXERIL) 10 mg tablet TAKE 1 TABLET TWICE A DAY AS NEEDED    furosemide (LASIX) 20 mg tablet Take 1 tab every 3 days as neeed     No current facility-administered medications for this visit. Allergies   Allergen Reactions    Lisinopril Swelling         Review of Systems:  - All review of systems negative other than mentioned in HPI  -   -     Physical Examination:   Blood pressure 116/73, pulse 83, temperature 97.4 °F (36.3 °C), temperature source Oral, resp. rate 22, height 5' 9\" (1.753 m), weight 322 lb (146.1 kg), SpO2 97 %. Estimated body mass index is 47.55 kg/m² as calculated from the following:    Height as of this encounter: 5' 9\" (1.753 m). -   Weight as of this encounter: 322 lb (146.1 kg).    - General: pleasant, no distress, good eye contact  - HEENT: no exopthalmos, no periorbital edema, no scleral/conjunctival injection, EOMI, no lid lag or stare  - Neck: supple, no thyromegaly, no nodules,no lymphadenopathy  - Cardiovascular: regular, normal rate, normal S1 and S2,  - Respiratory: clear to auscultation bilaterally, no respiratory distress  - Gastrointestinal: soft, nontender, nondistended,   - Musculoskeletal: no proximal muscle weakness in upper or lower extremities  - Integumentary: no tremors, no edema,  - Neurological:alert and oriented , no focal deficits  - Psychiatric: normal mood and affect   - Skin: Normal turgor, no rash    Diabetic foot exam: February 2019  Left:    Vibratory sensation absent    Filament test absent sensation with micro filament   Pulse DP: 1 +    Deformities: Callus    Right:    Vibratory sensation absent   Filament test absent sensation with micro filament   Pulse DP: 1+                     Deformities: Callus    Lab Results   Component Value Date/Time    Hemoglobin A1c >14.0 (H) 11/19/2019 10:58 AM    Hemoglobin A1c 15.3 (H) 07/17/2019 02:44 PM    Hemoglobin A1c 6.7 (H) 05/27/2016 10:24 AM    Glucose 639 (HH) 11/19/2019 10:58 AM    Glucose  04/11/2019 09:02 AM    Microalbumin/Creat ratio (mg/g creat)  11/19/2019 10:52 AM     Cannot calculate ratio due to microalbumin result outside reportable range. Microalbumin,urine random <0.50 11/19/2019 10:52 AM    LDL,Direct 132 (H) 04/11/2019 10:06 AM    LDL, calculated 81 05/27/2016 10:24 AM    Creatinine 1.15 11/19/2019 10:58 AM    Hemoglobin A1c, External 7.0 02/08/2017    Hemoglobin A1c, External 7.30 11/13/2014 04:29 AM      Lab Results   Component Value Date/Time    Cholesterol, total 141 05/27/2016 10:24 AM    HDL Cholesterol 43 05/27/2016 10:24 AM    LDL,Direct 132 (H) 04/11/2019 10:06 AM    LDL, calculated 81 05/27/2016 10:24 AM    Triglyceride 83 05/27/2016 10:24 AM     Lab Results   Component Value Date/Time    ALT (SGPT) 18 11/19/2019 10:58 AM    Alk.  phosphatase 99 11/19/2019 10:58 AM    Bilirubin, total 0.3 11/19/2019 10:58 AM     Lab Results   Component Value Date/Time    GFR est AA >60 11/19/2019 10:58 AM    GFR est non-AA >60 11/19/2019 10:58 AM    Creatinine 1.15 11/19/2019 10:58 AM    BUN 8 11/19/2019 10:58 AM    Sodium 132 (L) 11/19/2019 10:58 AM    Potassium 4.2 11/19/2019 10:58 AM    Chloride 97 11/19/2019 10:58 AM    CO2 25 11/19/2019 10:58 AM      Lab Results Component Value Date/Time    TSH 1.050 07/09/2015 08:10 AM        Assessment/Plan:     1. Type 2 diabetes mellitus with hyperglycemia, with long-term current use of insulin ([facility] [address]    2. Essential hypertension    3. Mixed hyperlipidemia    4. Morbid obesity ([facility] [address]        1. Type 2 Diabetes Mellitus with no nephropathy,neuropathy,retinopathy  Lab Results   Component Value Date/Time    Hemoglobin A1c >14.0 (H) 11/19/2019 10:58 AM    Hemoglobin A1c (POC) >15 04/11/2019 09:03 AM    Hemoglobin A1c, External 7.0 02/08/2017   Worsening of glycemic control  Asked to come back in 2 weeks with blood glucose monitor, need to crosscheck the blood glucose monitor with ours  Lantus 70 units, discontinued glimepiride  Diet is high in carbohydrates  Lantus  victoza  Weight loss stressed  metformin  DKA x2       2. HTN : Continue current therapy , CPAP     Anaphylaxis with ACE     3. Hyperlipidemia : Continue statin. 4.Obesity:Body mass index is 47.55 kg/m². Discussed about the importance of exercise and carbohydrate portion control. TSH normal   Late-night DST normal   He is on a very high calorie diet    5 EMRE - CPAP -       Patient Instructions   Lantus 80 units at night     Victoza 1.2 mg in AM     Continue metformin     Follow-up and Dispositions    · Return in about 2 weeks (around 8/28/2020). Thank you for allowing me to participate in the care of this patient.     Amira Chandler MD

## 2020-08-28 ENCOUNTER — OFFICE VISIT (OUTPATIENT)
Dept: ENDOCRINOLOGY | Age: 40
End: 2020-08-28
Payer: MEDICAID

## 2020-08-28 VITALS
RESPIRATION RATE: 20 BRPM | DIASTOLIC BLOOD PRESSURE: 84 MMHG | SYSTOLIC BLOOD PRESSURE: 134 MMHG | HEART RATE: 91 BPM | OXYGEN SATURATION: 92 % | TEMPERATURE: 98.4 F | BODY MASS INDEX: 46.65 KG/M2 | WEIGHT: 315 LBS | HEIGHT: 69 IN

## 2020-08-28 DIAGNOSIS — E66.01 MORBID OBESITY (HCC): ICD-10-CM

## 2020-08-28 DIAGNOSIS — E78.2 MIXED HYPERLIPIDEMIA: ICD-10-CM

## 2020-08-28 DIAGNOSIS — E11.65 TYPE 2 DIABETES MELLITUS WITH HYPERGLYCEMIA, WITH LONG-TERM CURRENT USE OF INSULIN (HCC): Primary | ICD-10-CM

## 2020-08-28 DIAGNOSIS — Z79.4 TYPE 2 DIABETES MELLITUS WITH HYPERGLYCEMIA, WITH LONG-TERM CURRENT USE OF INSULIN (HCC): Primary | ICD-10-CM

## 2020-08-28 LAB
ALBUMIN SERPL-MCNC: 3.7 G/DL (ref 3.5–5)
ALBUMIN/GLOB SERPL: 1 {RATIO} (ref 1.1–2.2)
ALP SERPL-CCNC: 83 U/L (ref 45–117)
ALT SERPL-CCNC: 20 U/L (ref 12–78)
ANION GAP SERPL CALC-SCNC: 5 MMOL/L (ref 5–15)
AST SERPL-CCNC: 8 U/L (ref 15–37)
BILIRUB SERPL-MCNC: 0.5 MG/DL (ref 0.2–1)
BUN SERPL-MCNC: 8 MG/DL (ref 6–20)
BUN/CREAT SERPL: 11 (ref 12–20)
CALCIUM SERPL-MCNC: 8.9 MG/DL (ref 8.5–10.1)
CHLORIDE SERPL-SCNC: 107 MMOL/L (ref 97–108)
CO2 SERPL-SCNC: 27 MMOL/L (ref 21–32)
CREAT SERPL-MCNC: 0.76 MG/DL (ref 0.7–1.3)
EST. AVERAGE GLUCOSE BLD GHB EST-MCNC: 258 MG/DL
GLOBULIN SER CALC-MCNC: 3.7 G/DL (ref 2–4)
GLUCOSE SERPL-MCNC: 134 MG/DL (ref 65–100)
HBA1C MFR BLD: 10.6 % (ref 4–5.6)
POTASSIUM SERPL-SCNC: 4 MMOL/L (ref 3.5–5.1)
PROT SERPL-MCNC: 7.4 G/DL (ref 6.4–8.2)
SODIUM SERPL-SCNC: 139 MMOL/L (ref 136–145)

## 2020-08-28 PROCEDURE — 99214 OFFICE O/P EST MOD 30 MIN: CPT | Performed by: INTERNAL MEDICINE

## 2020-08-28 NOTE — PROGRESS NOTES
Rivka Cervantes is a 38 y.o. male here for   Chief Complaint   Patient presents with    Diabetes       1. Have you been to the ER, urgent care clinic since your last visit? Hospitalized since your last visit? -no    2. Have you seen or consulted any other health care providers outside of the [address] [place] [address] since your last visit?   Include any pap smears or colon screening.-no

## 2020-08-28 NOTE — LETTER
8/29/20 Patient: Jay Wilkerson YOB: 1980 Date of Visit: 8/28/2020 Addis Ambriz MD 
[address] [place] [address] VIA Facsimile: [phone] Dear Addis Ambriz MD, Thank you for referring Mr. Jay Wilkerson to [address] [place] [address] for evaluation. My notes for this consultation are attached. If you have questions, please do not hesitate to call me. I look forward to following your patient along with you. Sincerely, Jevon Ramirez MD

## 2020-08-28 NOTE — PROGRESS NOTES
[facility]               Jossie Kehr ,MD      Patient Information  Date:8/28/2020  Name : Kayleen Castleman 38 y.o.     YOB: 1980         Referred by: MD Tash Rockwell MD      Chief Complaint   Patient presents with    Diabetes     History of Present Illness: Kayleen Castleman is a 38 y.o. male here for fu  of  Type 2 Diabetes Mellitus. Missed the appointment  He is checking the blood glucose now in the morning  Meter - checked ,  - 190   Tolerating Victoza    No exercise , walking some   No blurry vision,no pain the feet   History of noncompliance,    2 meals a day, snacks for lunch    No fever, cough, shortness of breath      History from February 2019  When ever insulin is expensive he stops and then has hyperglycemia , goes to ER . I have discussed with him several times about getting Lantus for less than $10 with coupon and given coupon  Also discussed about [employer] brand insulin        He has severe insulin resistance. Wt Readings from Last 3 Encounters:   08/28/20 321 lb (145.6 kg)   08/14/20 322 lb (146.1 kg)   11/26/19 303 lb (137.4 kg)       BP Readings from Last 3 Encounters:   08/28/20 134/84   08/14/20 116/73   11/26/19 142/82           Past Medical History:   Diagnosis Date    Bronchitis     Diabetes ([address]     HTN (hypertension)     Hyperlipidemia      Current Outpatient Medications   Medication Sig    liraglutide (VICTOZA) 0.6 mg/0.1 mL (18 mg/3 mL) pnij Inject in AM 1.2 mg daily.  simvastatin (ZOCOR) 20 mg tablet TAKE 1 TABLET NIGHTLY    insulin glargine (Lantus Solostar U-100 Insulin) 100 unit/mL (3 mL) inpn INJECT AS DIRECTED UP  UNITS PER DAY (Patient taking differently: 80 Units by SubCUTAneous route nightly.  INJECT AS DIRECTED UP  UNITS PER DAY)    Insulin Needles, Disposable, (AYANA PEN NEEDLE) 32 gauge x 5/32\" ndle Use to inject insulin and Victoza daily  DX CODE: E11.65    metFORMIN (GLUCOPHAGE) 1,000 mg tablet Take 1 Tab by mouth two (2) times daily (with meals).  glucose blood VI test strips (ONETOUCH ULTRA TEST) strip Test blood glucose three times daily Dx Code: E11.65    lancets (ONE TOUCH DELICA) 33 gauge misc Test blood glucose three times daily Dx Code: E11.65    Blood-Glucose Meter (ONETOUCH ULTRAMINI) monitoring kit Test blood glucose three times daily Dx Code: E11.65    metoprolol tartrate (LOPRESSOR) 50 mg tablet Take 1 Tab by mouth daily.  hydroCHLOROthiazide (HYDRODIURIL) 25 mg tablet TAKE 1 TABLET DAILY    cyclobenzaprine (FLEXERIL) 10 mg tablet TAKE 1 TABLET TWICE A DAY AS NEEDED    furosemide (LASIX) 20 mg tablet Take 1 tab every 3 days as neeed     No current facility-administered medications for this visit. Allergies   Allergen Reactions    Lisinopril Swelling         Review of Systems:  - All review of systems negative other than mentioned in HPI  -   -     Physical Examination:   Blood pressure 134/84, pulse 91, temperature 98.4 °F (36.9 °C), temperature source Oral, resp. rate 20, height 5' 9\" (1.753 m), weight 321 lb (145.6 kg), SpO2 92 %. Estimated body mass index is 47.4 kg/m² as calculated from the following:    Height as of this encounter: 5' 9\" (1.753 m). -   Weight as of this encounter: 321 lb (145.6 kg).    - General: pleasant, no distress, good eye contact  - HEENT: no exopthalmos, no periorbital edema, no scleral/conjunctival injection, EOMI, no lid lag or stare  - Neck: supple, no thyromegaly, no nodules,no lymphadenopathy  - Cardiovascular: regular, normal rate, normal S1 and S2,  - Respiratory: clear to auscultation bilaterally, no respiratory distress  - Gastrointestinal: soft, nontender, nondistended,   - Musculoskeletal: no proximal muscle weakness in upper or lower extremities  - Integumentary: no tremors, no edema,  - Neurological:alert and oriented , no focal deficits  - Psychiatric: normal mood and affect   - Skin: Normal turgor, no rash    Diabetic foot exam: February 2019  Left:    Vibratory sensation absent    Filament test absent sensation with micro filament   Pulse DP: 1 +    Deformities: Callus    Right:    Vibratory sensation absent   Filament test absent sensation with micro filament   Pulse DP: 1+                     Deformities: Callus    Lab Results   Component Value Date/Time    Hemoglobin A1c >14.0 (H) 11/19/2019 10:58 AM    Hemoglobin A1c 15.3 (H) 07/17/2019 02:44 PM    Hemoglobin A1c 6.7 (H) 05/27/2016 10:24 AM    Glucose 639 (HH) 11/19/2019 10:58 AM    Glucose  04/11/2019 09:02 AM    Microalbumin/Creat ratio (mg/g creat)  11/19/2019 10:52 AM     Cannot calculate ratio due to microalbumin result outside reportable range. Microalbumin,urine random <0.50 11/19/2019 10:52 AM    LDL,Direct 132 (H) 04/11/2019 10:06 AM    LDL, calculated 81 05/27/2016 10:24 AM    Creatinine 1.15 11/19/2019 10:58 AM    Hemoglobin A1c, External 7.0 02/08/2017    Hemoglobin A1c, External 7.30 11/13/2014 04:29 AM      Lab Results   Component Value Date/Time    Cholesterol, total 141 05/27/2016 10:24 AM    HDL Cholesterol 43 05/27/2016 10:24 AM    LDL,Direct 132 (H) 04/11/2019 10:06 AM    LDL, calculated 81 05/27/2016 10:24 AM    Triglyceride 83 05/27/2016 10:24 AM     Lab Results   Component Value Date/Time    ALT (SGPT) 18 11/19/2019 10:58 AM    Alk. phosphatase 99 11/19/2019 10:58 AM    Bilirubin, total 0.3 11/19/2019 10:58 AM     Lab Results   Component Value Date/Time    GFR est AA >60 11/19/2019 10:58 AM    GFR est non-AA >60 11/19/2019 10:58 AM    Creatinine 1.15 11/19/2019 10:58 AM    BUN 8 11/19/2019 10:58 AM    Sodium 132 (L) 11/19/2019 10:58 AM    Potassium 4.2 11/19/2019 10:58 AM    Chloride 97 11/19/2019 10:58 AM    CO2 25 11/19/2019 10:58 AM      Lab Results   Component Value Date/Time    TSH 1.050 07/09/2015 08:10 AM        Assessment/Plan:     1. Type 2 diabetes mellitus with hyperglycemia, with long-term current use of [facility] ([facility]    2.  Mixed hyperlipidemia        1. Type 2 Diabetes Mellitus with no nephropathy,neuropathy,retinopathy  Lab Results   Component Value Date/Time    Hemoglobin A1c >14.0 (H) 11/19/2019 10:58 AM    Hemoglobin A1c (POC) >15 04/11/2019 09:03 AM    Hemoglobin A1c, External 7.0 02/08/2017   Expect A1c to improve    Lantus 80 units, discontinued glimepiride  Diet is high in carbohydrates  Lantus  victoza  Weight loss stressed  metformin  DKA x2       2. HTN : Continue current therapy , CPAP     Anaphylaxis with ACE     3. Hyperlipidemia : Continue statin. 4.Obesity:Body mass index is 47.4 kg/m². Discussed about the importance of exercise and carbohydrate portion control. TSH normal   Late-night DST normal   He is on a very high calorie diet    5 EMRE - CPAP -       Patient Instructions   Lantus 80 units at night     Victoza 1.2 mg in AM     Continue metformin         Thank you for allowing me to participate in the care of this patient.     Geraldine Gerber MD

## 2020-12-03 ENCOUNTER — OFFICE VISIT (OUTPATIENT)
Dept: ENDOCRINOLOGY | Age: 40
End: 2020-12-03
Payer: MEDICAID

## 2020-12-03 VITALS
BODY MASS INDEX: 44.73 KG/M2 | DIASTOLIC BLOOD PRESSURE: 81 MMHG | SYSTOLIC BLOOD PRESSURE: 117 MMHG | OXYGEN SATURATION: 97 % | RESPIRATION RATE: 20 BRPM | TEMPERATURE: 97.8 F | HEART RATE: 78 BPM | HEIGHT: 69 IN | WEIGHT: 302 LBS

## 2020-12-03 DIAGNOSIS — E66.01 OBESITY, MORBID (HCC): ICD-10-CM

## 2020-12-03 DIAGNOSIS — E78.2 MIXED HYPERLIPIDEMIA: ICD-10-CM

## 2020-12-03 DIAGNOSIS — E11.65 TYPE 2 DIABETES MELLITUS WITH HYPERGLYCEMIA, WITH LONG-TERM CURRENT USE OF INSULIN (HCC): Primary | ICD-10-CM

## 2020-12-03 DIAGNOSIS — Z79.4 TYPE 2 DIABETES MELLITUS WITH HYPERGLYCEMIA, WITH LONG-TERM CURRENT USE OF INSULIN (HCC): Primary | ICD-10-CM

## 2020-12-03 LAB
ALBUMIN SERPL-MCNC: 3.8 G/DL (ref 3.5–5)
ALBUMIN/GLOB SERPL: 1.1 {RATIO} (ref 1.1–2.2)
ALP SERPL-CCNC: 104 U/L (ref 45–117)
ALT SERPL-CCNC: 27 U/L (ref 12–78)
ANION GAP SERPL CALC-SCNC: 7 MMOL/L (ref 5–15)
AST SERPL-CCNC: 12 U/L (ref 15–37)
BILIRUB SERPL-MCNC: 0.6 MG/DL (ref 0.2–1)
BUN SERPL-MCNC: 13 MG/DL (ref 6–20)
BUN/CREAT SERPL: 15 (ref 12–20)
CALCIUM SERPL-MCNC: 9 MG/DL (ref 8.5–10.1)
CHLORIDE SERPL-SCNC: 98 MMOL/L (ref 97–108)
CO2 SERPL-SCNC: 29 MMOL/L (ref 21–32)
CREAT SERPL-MCNC: 0.87 MG/DL (ref 0.7–1.3)
EST. AVERAGE GLUCOSE BLD GHB EST-MCNC: ABNORMAL MG/DL
GLOBULIN SER CALC-MCNC: 3.5 G/DL (ref 2–4)
GLUCOSE SERPL-MCNC: 336 MG/DL (ref 65–100)
HBA1C MFR BLD: >14 % (ref 4–5.6)
POTASSIUM SERPL-SCNC: 3.7 MMOL/L (ref 3.5–5.1)
PROT SERPL-MCNC: 7.3 G/DL (ref 6.4–8.2)
SODIUM SERPL-SCNC: 134 MMOL/L (ref 136–145)

## 2020-12-03 PROCEDURE — 99214 OFFICE O/P EST MOD 30 MIN: CPT | Performed by: INTERNAL MEDICINE

## 2020-12-03 NOTE — PROGRESS NOTES
Barney Rousseau is a 38 y.o. male here for   Chief Complaint   Patient presents with    Diabetes       1. Have you been to the ER, urgent care clinic since your last visit? Hospitalized since your last visit? -no    2. Have you seen or consulted any other health care providers outside of the [address] [place] [address] since your last visit? Include any pap smears or colon screening. -PCP    Order placed for pt per verbal order with read back from Dr. Loenid Kimble 12/03/20

## 2020-12-03 NOTE — LETTER
12/5/20 Patient: Isai Pickett YOB: 1980 Date of Visit: 12/3/2020 Cindy Stephens MD 
[address] [place] [id number] VIA Facsimile: [phone] Dear Cindy Stephens MD, Thank you for referring Mr. Isai Pickett to [address] [place] [address] for evaluation. My notes for this consultation are attached. If you have questions, please do not hesitate to call me. I look forward to following your patient along with you. Sincerely, Halie Jin MD

## 2020-12-05 NOTE — PROGRESS NOTES
Louie Ag MD      Patient Information  Date:12/5/2020  Name : Lai Mai 38 y.o.     YOB: 1980         Referred by: MD Denzel Montano MD      Chief Complaint   Patient presents with    Diabetes     History of Present Illness: Lai Mai is a 38 y.o. male here for fu  of  Type 2 Diabetes Mellitus. Missed the appointment  Reports checking BG   No meter or log   Tolerating Victoza    No exercise , walking some   No blurry vision,no pain the feet   History of noncompliance,      No fever, cough, shortness of breath      History from February 2019  When ever insulin is expensive he stops and then has hyperglycemia , goes to ER . I have discussed with him several times about getting Lantus for less than $10 with coupon and given coupon  Also discussed about [employer] brand insulin        He has severe insulin resistance. Wt Readings from Last 3 Encounters:   12/03/20 302 lb (137 kg)   08/28/20 321 lb (145.6 kg)   08/14/20 322 lb (146.1 kg)       BP Readings from Last 3 Encounters:   12/03/20 117/81   08/28/20 134/84   08/14/20 116/73           Past Medical History:   Diagnosis Date    Bronchitis     Diabetes ([address]     HTN (hypertension)     Hyperlipidemia      Current Outpatient Medications   Medication Sig    liraglutide (VICTOZA) 0.6 mg/0.1 mL (18 mg/3 mL) pnij Inject in AM 1.2 mg daily.  simvastatin (ZOCOR) 20 mg tablet TAKE 1 TABLET NIGHTLY    insulin glargine (Lantus Solostar U-100 Insulin) 100 unit/mL (3 mL) inpn INJECT AS DIRECTED UP  UNITS PER DAY (Patient taking differently: 80 Units by SubCUTAneous route nightly. INJECT AS DIRECTED UP  UNITS PER DAY)    Insulin Needles, Disposable, (AYANA PEN NEEDLE) 32 gauge x 5/32\" ndle Use to inject insulin and Victoza daily  DX CODE: E11.65    metFORMIN (GLUCOPHAGE) 1,000 mg tablet Take 1 Tab by mouth two (2) times daily (with meals).     glucose blood VI test strips (ONETOUCH ULTRA TEST) strip Test blood glucose three times daily Dx Code: E11.65    lancets (ONE TOUCH DELICA) 33 gauge misc Test blood glucose three times daily Dx Code: E11.65    Blood-Glucose Meter (ONETOUCH ULTRAMINI) monitoring kit Test blood glucose three times daily Dx Code: E11.65    metoprolol tartrate (LOPRESSOR) 50 mg tablet Take 1 Tab by mouth daily.  hydroCHLOROthiazide (HYDRODIURIL) 25 mg tablet TAKE 1 TABLET DAILY    cyclobenzaprine (FLEXERIL) 10 mg tablet TAKE 1 TABLET TWICE A DAY AS NEEDED    furosemide (LASIX) 20 mg tablet Take 1 tab every 3 days as neeed     No current facility-administered medications for this visit. Allergies   Allergen Reactions    Lisinopril Swelling         Review of Systems:  - All review of systems negative other than mentioned in HPI  -   -     Physical Examination:   Blood pressure 117/81, pulse 78, temperature 97.8 °F (36.6 °C), temperature source Oral, resp. rate 20, height 5' 9\" (1.753 m), weight 302 lb (137 kg), SpO2 97 %. Estimated body mass index is 44.6 kg/m² as calculated from the following:    Height as of this encounter: 5' 9\" (1.753 m). -   Weight as of this encounter: 302 lb (137 kg).    - General: pleasant, no distress, good eye contact  - HEENT: no exopthalmos, no periorbital edema, no scleral/conjunctival injection, EOMI, no lid lag or stare  - Neck: supple, no thyromegaly,   - Cardiovascular: regular, normal rate, normal S1 and S2,  - Respiratory: clear to auscultation bilaterally, no respiratory distress  - Gastrointestinal: soft, nontender, nondistended,   - Musculoskeletal: no proximal muscle weakness in upper or lower extremities  - Integumentary: no tremors, no edema,  - Neurological:alert and oriented , no focal deficits  - Psychiatric: normal mood and affect   - Skin: Normal turgor, no rash    Diabetic foot exam: February 2019  Left:    Vibratory sensation absent    Filament test absent sensation with micro filament   Pulse DP: 1 +    Deformities: Callus    Right:    Vibratory sensation absent   Filament test absent sensation with micro filament   Pulse DP: 1+                     Deformities: Callus    Lab Results   Component Value Date/Time    Hemoglobin A1c >14.0 (H) 12/03/2020 11:33 AM    Hemoglobin A1c 10.6 (H) 08/28/2020 01:47 PM    Hemoglobin A1c >14.0 (H) 11/19/2019 10:58 AM    Glucose 336 (H) 12/03/2020 11:33 AM    Glucose  04/11/2019 09:02 AM    Microalbumin/Creat ratio (mg/g creat)  11/19/2019 10:52 AM     Cannot calculate ratio due to microalbumin result outside reportable range. Microalbumin,urine random <0.50 11/19/2019 10:52 AM    LDL,Direct 132 (H) 04/11/2019 10:06 AM    LDL, calculated 81 05/27/2016 10:24 AM    Creatinine 0.87 12/03/2020 11:33 AM    Hemoglobin A1c, External 7.0 02/08/2017    Hemoglobin A1c, External 7.30 11/13/2014 04:29 AM      Lab Results   Component Value Date/Time    Cholesterol, total 141 05/27/2016 10:24 AM    HDL Cholesterol 43 05/27/2016 10:24 AM    LDL,Direct 132 (H) 04/11/2019 10:06 AM    LDL, calculated 81 05/27/2016 10:24 AM    Triglyceride 83 05/27/2016 10:24 AM     Lab Results   Component Value Date/Time    ALT (SGPT) 27 12/03/2020 11:33 AM    Alk. phosphatase 104 12/03/2020 11:33 AM    Bilirubin, total 0.6 12/03/2020 11:33 AM     Lab Results   Component Value Date/Time    GFR est AA >60 12/03/2020 11:33 AM    GFR est non-AA >60 12/03/2020 11:33 AM    Creatinine 0.87 12/03/2020 11:33 AM    BUN 13 12/03/2020 11:33 AM    Sodium 134 (L) 12/03/2020 11:33 AM    Potassium 3.7 12/03/2020 11:33 AM    Chloride 98 12/03/2020 11:33 AM    CO2 29 12/03/2020 11:33 AM      Lab Results   Component Value Date/Time    TSH 1.050 07/09/2015 08:10 AM        Assessment/Plan:     1. Type 2 diabetes mellitus with hyperglycemia, with long-term current use of insulin (HCC)    2. Obesity, morbid ([address]    3. Mixed hyperlipidemia        1.  Type 2 Diabetes Mellitus with no nephropathy,neuropathy,retinopathy  Lab Results   Component Value Date/Time    Hemoglobin A1c >14.0 (H) 12/03/2020 11:33 AM    Hemoglobin A1c (POC) >15 04/11/2019 09:03 AM    Hemoglobin A1c, External 7.0 02/08/2017   poorly controlled DM , non adherent   Lantus 80 units,  Diet is high in carbohydrates  victoza  Weight loss stressed  metformin  DKA x2       2. HTN : Continue current therapy , CPAP     Anaphylaxis with ACE     3. Hyperlipidemia : Continue statin. 4.Obesity:Body mass index is 44.6 kg/m². Discussed about the importance of exercise and carbohydrate portion control. TSH normal   Late-night DST normal   He is on a very high calorie diet    5 EMRE - CPAP -       There are no Patient Instructions on file for this visit. Follow-up and Dispositions    · Return in about 3 months (around 3/3/2021) for labs before next visit and follow up. Thank you for allowing me to participate in the care of this patient.     Jimbo Silva MD

## 2021-03-18 LAB
EST. AVERAGE GLUCOSE BLD GHB EST-MCNC: 329 MG/DL
HBA1C MFR BLD: 13.1 % (ref 4.8–5.6)

## 2021-03-19 ENCOUNTER — OFFICE VISIT (OUTPATIENT)
Dept: ENDOCRINOLOGY | Age: 41
End: 2021-03-19
Payer: MEDICAID

## 2021-03-19 VITALS
BODY MASS INDEX: 44.73 KG/M2 | HEART RATE: 71 BPM | DIASTOLIC BLOOD PRESSURE: 73 MMHG | WEIGHT: 302 LBS | OXYGEN SATURATION: 97 % | SYSTOLIC BLOOD PRESSURE: 113 MMHG | RESPIRATION RATE: 18 BRPM | TEMPERATURE: 97.2 F | HEIGHT: 69 IN

## 2021-03-19 DIAGNOSIS — E78.2 MIXED HYPERLIPIDEMIA: ICD-10-CM

## 2021-03-19 DIAGNOSIS — Z79.4 TYPE 2 DIABETES MELLITUS WITH HYPERGLYCEMIA, WITH LONG-TERM CURRENT USE OF INSULIN (HCC): Primary | ICD-10-CM

## 2021-03-19 DIAGNOSIS — E11.65 TYPE 2 DIABETES MELLITUS WITH HYPERGLYCEMIA, WITH LONG-TERM CURRENT USE OF INSULIN (HCC): Primary | ICD-10-CM

## 2021-03-19 DIAGNOSIS — I10 ESSENTIAL HYPERTENSION: ICD-10-CM

## 2021-03-19 PROCEDURE — 99214 OFFICE O/P EST MOD 30 MIN: CPT | Performed by: INTERNAL MEDICINE

## 2021-03-19 RX ORDER — PIOGLITAZONEHYDROCHLORIDE 15 MG/1
15 TABLET ORAL DAILY
Qty: 30 TAB | Refills: 4 | Status: SHIPPED | OUTPATIENT
Start: 2021-03-19

## 2021-03-19 NOTE — PROGRESS NOTES
Malorie Hicks MD      Patient Information  Date:3/19/2021  Name : Thad Altamirano 38 y.o.     YOB: 1980         Referred by: MD Lincoln Guillen MD      Chief Complaint   Patient presents with    Diabetes     History of Present Illness: Thad Altamirano is a 38 y.o. male here for fu  of  Type 2 Diabetes Mellitus. Has log ,BG is ranging from 200-250  On Lantus 80 units    [facility]    Tolerating Victoza    No exercise  No blurry vision,no pain the feet   History of nonadherence to diet      No fever, cough, shortness of breath      History from February 2019  When ever insulin is expensive he stops and then has hyperglycemia , goes to ER . I have discussed with him several times about getting Lantus for less than $10 with coupon and given coupon  Also discussed about [employer] brand insulin    He has severe insulin resistance. Wt Readings from Last 3 Encounters:   03/19/21 302 lb (137 kg)   12/03/20 302 lb (137 kg)   08/28/20 321 lb (145.6 kg)       BP Readings from Last 3 Encounters:   03/19/21 113/73   12/03/20 117/81   08/28/20 134/84           Past Medical History:   Diagnosis Date    Bronchitis     Diabetes ([address]     HTN (hypertension)     Hyperlipidemia      Current Outpatient Medications   Medication Sig    liraglutide (VICTOZA) 0.6 mg/0.1 mL (18 mg/3 mL) pnij Inject in AM 1.2 mg daily.  simvastatin (ZOCOR) 20 mg tablet TAKE 1 TABLET NIGHTLY    insulin glargine (Lantus Solostar U-100 Insulin) 100 unit/mL (3 mL) inpn INJECT AS DIRECTED UP  UNITS PER DAY (Patient taking differently: 80 Units by SubCUTAneous route nightly.  INJECT AS DIRECTED UP  UNITS PER DAY)    Insulin Needles, Disposable, (AYANA PEN NEEDLE) 32 gauge x 5/32\" ndle Use to inject insulin and Victoza daily  DX CODE: E11.65    metFORMIN (GLUCOPHAGE) 1,000 mg tablet Take 1 Tab by mouth two (2) times daily (with meals).  glucose blood VI test strips (ONETOUCH ULTRA TEST) strip Test blood glucose three times daily Dx Code: E11.65    lancets (ONE TOUCH DELICA) 33 gauge misc Test blood glucose three times daily Dx Code: E11.65    Blood-Glucose Meter (ONETOUCH ULTRAMINI) monitoring kit Test blood glucose three times daily Dx Code: E11.65    metoprolol tartrate (LOPRESSOR) 50 mg tablet Take 1 Tab by mouth daily.  hydroCHLOROthiazide (HYDRODIURIL) 25 mg tablet TAKE 1 TABLET DAILY    cyclobenzaprine (FLEXERIL) 10 mg tablet TAKE 1 TABLET TWICE A DAY AS NEEDED    furosemide (LASIX) 20 mg tablet Take 1 tab every 3 days as neeed     No current facility-administered medications for this visit. Allergies   Allergen Reactions    Lisinopril Swelling         Review of Systems:  - All review of systems negative other than mentioned in HPI  -   -     Physical Examination:   Blood pressure 113/73, pulse 71, temperature 97.2 °F (36.2 °C), temperature source Oral, resp. rate 18, height 5' 9\" (1.753 m), weight 302 lb (137 kg), SpO2 97 %. Estimated body mass index is 44.6 kg/m² as calculated from the following:    Height as of this encounter: 5' 9\" (1.753 m). -   Weight as of this encounter: 302 lb (137 kg).    - General: pleasant, no distress, good eye contact  - HEENT: no exopthalmos, no periorbital edema, no scleral/conjunctival injection, EOMI, no lid lag or stare  - Neck: supple, no thyromegaly,   - Cardiovascular: regular, normal rate, normal S1 and S2,  - Respiratory: clear to auscultation bilaterally, no respiratory distress  -   - Musculoskeletal: no proximal muscle weakness in upper or lower extremities  - Integumentary: no tremors, no edema,  - Neurological:alert and oriented , no focal deficits  - Psychiatric: normal mood and affect   - Skin: Normal turgor, no rash    History of callus    Lab Results   Component Value Date/Time    Hemoglobin A1c 13.1 (H) 03/17/2021 11:49 AM    Hemoglobin A1c >14.0 (H) 12/03/2020 11:33 AM    Hemoglobin A1c 10.6 (H) 08/28/2020 01:47 PM    Glucose 336 (H) 12/03/2020 11:33 AM    Glucose  04/11/2019 09:02 AM    Microalbumin/Creat ratio (mg/g creat)  11/19/2019 10:52 AM     Cannot calculate ratio due to microalbumin result outside reportable range. Microalbumin,urine random <0.50 11/19/2019 10:52 AM    LDL,Direct 132 (H) 04/11/2019 10:06 AM    LDL, calculated 81 05/27/2016 10:24 AM    Creatinine 0.87 12/03/2020 11:33 AM    Hemoglobin A1c, External 7.0 02/08/2017    Hemoglobin A1c, External 7.30 11/13/2014 04:29 AM      Lab Results   Component Value Date/Time    Cholesterol, total 141 05/27/2016 10:24 AM    HDL Cholesterol 43 05/27/2016 10:24 AM    LDL,Direct 132 (H) 04/11/2019 10:06 AM    LDL, calculated 81 05/27/2016 10:24 AM    Triglyceride 83 05/27/2016 10:24 AM     Lab Results   Component Value Date/Time    ALT (SGPT) 27 12/03/2020 11:33 AM    Alk. phosphatase 104 12/03/2020 11:33 AM    Bilirubin, total 0.6 12/03/2020 11:33 AM     Lab Results   Component Value Date/Time    GFR est AA >60 12/03/2020 11:33 AM    GFR est non-AA >60 12/03/2020 11:33 AM    Creatinine 0.87 12/03/2020 11:33 AM    BUN 13 12/03/2020 11:33 AM    Sodium 134 (L) 12/03/2020 11:33 AM    Potassium 3.7 12/03/2020 11:33 AM    Chloride 98 12/03/2020 11:33 AM    CO2 29 12/03/2020 11:33 AM      Lab Results   Component Value Date/Time    TSH 1.050 07/09/2015 08:10 AM        Assessment/Plan:     1. Type 2 diabetes mellitus with hyperglycemia, with long-term current use of insulin ([address]    2. Essential hypertension    3. Mixed hyperlipidemia        1.  Type 2 Diabetes Mellitus with no nephropathy,neuropathy,retinopathy  Lab Results   Component Value Date/Time    Hemoglobin A1c 13.1 (H) 03/17/2021 11:49 AM    Hemoglobin A1c (POC) >15 04/11/2019 09:03 AM    Hemoglobin A1c, External 7.0 02/08/2017   poorly controlled DM , non adherent to diet  Lantus 80 units,  Diet is high in carbohydrates  victoza  Weight loss stressed  metformin  DKA x2   Flavored water, avoid juices , cut down on snack portions  Encouraged to lose weight    2. HTN : Continue current therapy , CPAP     Anaphylaxis with ACE     3. Hyperlipidemia : Continue statin. 4.Obesity:Body mass index is 44.6 kg/m². Discussed about the importance of exercise and carbohydrate portion control. TSH normal   Late-night DST normal   He is on a very high calorie diet    5 EMRE - CPAP -       There are no Patient Instructions on file for this visit. Thank you for allowing me to participate in the care of this patient.     Valeriy Chacon MD

## 2021-03-19 NOTE — LETTER
3/20/2021 Patient: Ki Maxwell YOB: 1980 Date of Visit: 3/19/2021 Jose Noel MD 
[address] [place] [id number] Via Fax: [phone] Dear Jose Noel MD, Thank you for referring Mr. Ki Maxwell to [address] [place] [address] for evaluation. My notes for this consultation are attached. If you have questions, please do not hesitate to call me. I look forward to following your patient along with you. Sincerely, Ana Paula Ren MD

## 2021-03-19 NOTE — PROGRESS NOTES
Darren Rapp is a 38 y.o. male here for   Chief Complaint   Patient presents with    Diabetes       1. Have you been to the ER, urgent care clinic since your last visit? Hospitalized since your last visit? -no    2. Have you seen or consulted any other health care providers outside of the [address] [place] [address] since your last visit?   Include any pap smears or colon screening.-no

## 2021-04-15 DIAGNOSIS — E11.65 TYPE 2 DIABETES MELLITUS WITH HYPERGLYCEMIA, WITH LONG-TERM CURRENT USE OF INSULIN (HCC): ICD-10-CM

## 2021-04-15 DIAGNOSIS — Z79.4 TYPE 2 DIABETES MELLITUS WITH HYPERGLYCEMIA, WITH LONG-TERM CURRENT USE OF INSULIN (HCC): ICD-10-CM

## 2021-04-15 RX ORDER — PEN NEEDLE, DIABETIC 31 GX3/16"
NEEDLE, DISPOSABLE MISCELLANEOUS
Qty: 100 PEN NEEDLE | Refills: 11 | Status: SHIPPED | OUTPATIENT
Start: 2021-04-15 | End: 2021-10-19

## 2021-04-21 ENCOUNTER — OFFICE VISIT (OUTPATIENT)
Dept: UROLOGY | Age: 41
End: 2021-04-21
Payer: MEDICAID

## 2021-04-21 VITALS
TEMPERATURE: 98.6 F | RESPIRATION RATE: 24 BRPM | HEIGHT: 68 IN | OXYGEN SATURATION: 98 % | BODY MASS INDEX: 45.77 KG/M2 | DIASTOLIC BLOOD PRESSURE: 88 MMHG | SYSTOLIC BLOOD PRESSURE: 138 MMHG | HEART RATE: 66 BPM | WEIGHT: 302 LBS

## 2021-04-21 DIAGNOSIS — N40.0 BPH WITHOUT URINARY OBSTRUCTION: ICD-10-CM

## 2021-04-21 DIAGNOSIS — Z80.42 FAMILY HISTORY OF PROSTATE CANCER: ICD-10-CM

## 2021-04-21 DIAGNOSIS — Z12.5 SCREENING PSA (PROSTATE SPECIFIC ANTIGEN): Primary | ICD-10-CM

## 2021-04-21 LAB
BILIRUB UR QL STRIP: NEGATIVE
GLUCOSE UR-MCNC: NORMAL MG/DL
KETONES P FAST UR STRIP-MCNC: NEGATIVE MG/DL
PH UR STRIP: 5.5 [PH] (ref 4.6–8)
PROT UR QL STRIP: NEGATIVE
SP GR UR STRIP: 1.02 (ref 1–1.03)
UA UROBILINOGEN AMB POC: NORMAL (ref 0.2–1)
URINALYSIS CLARITY POC: CLEAR
URINALYSIS COLOR POC: YELLOW
URINE BLOOD POC: NEGATIVE
URINE LEUKOCYTES POC: NEGATIVE
URINE NITRITES POC: NEGATIVE

## 2021-04-21 PROCEDURE — 81003 URINALYSIS AUTO W/O SCOPE: CPT | Performed by: UROLOGY

## 2021-04-21 PROCEDURE — 99203 OFFICE O/P NEW LOW 30 MIN: CPT | Performed by: UROLOGY

## 2021-04-21 NOTE — PROGRESS NOTES
HISTORY OF PRESENT ILLNESS  Jyoti Ruelas is a 38 y.o. male. Chief Complaint   Patient presents with    New Patient    Other     Just wants to get checked due to family history      He is a 38 year AA man with a PMH of DM, dyslipidemia, HTN, obesity and sleep apnea. He desire a prostate checkup. He had no prior evaluation. He has a maternal uncle with prostate cancer. There is stomach cancer, breast cancer on that side of the family. He has 3 sisters and a half brother without and any cancer history. He denies a slow stream, dysuria, hematuria, urgency or frequency. Chronic Conditions Addressed Today     1. BPH without urinary obstruction     Current Assessment & Plan      No bothersome LUTS          Relevant Orders     PSA, DIAGNOSTIC (PROSTATE SPECIFIC AG)    2. Family history of prostate cancer     Current Assessment & Plan      As recommended by AUA guidelines, we discussed the benefits and harms of PSA testing and emphasized that men age 53-78 years at average risk, with no family history of prostate cancer and not , are those most likely to benefit, especially if they have an anticipated life span of more than 10-15 years. He is younger than recommended for routine screening however a baseline PSA may be helpful. If <0.5 I would defer next PSA 5 years. I would emphasize that those at higher risk (with a family history of prostate cancer, genetic predisposition and  men) are more likely to benefit from screening. He has no prior PSA test.     Noncancerous factors can cause elevated serum PSA levels, such as prostatitis, BPH, instrumentation, and trauma.      He wishes to proceed to a PSA test          Relevant Orders     AMB POC URINALYSIS DIP STICK AUTO W/O MICRO     PSA, DIAGNOSTIC (PROSTATE SPECIFIC AG)    3. Screening PSA (prostate specific antigen) - Primary          Review of Systems   All other systems reviewed and are negative. Physical Exam  Constitutional:       General: He is not in acute distress. Appearance: Normal appearance. HENT:      Head: Normocephalic and atraumatic. Eyes:      Extraocular Movements: Extraocular movements intact. Pupils: Pupils are equal, round, and reactive to light. Cardiovascular:      Rate and Rhythm: Normal rate and regular rhythm. Pulmonary:      Effort: Pulmonary effort is normal. No respiratory distress. Breath sounds: Normal breath sounds. No wheezing or rhonchi. Abdominal:      Tenderness: There is no abdominal tenderness. There is no right CVA tenderness, left CVA tenderness, guarding or rebound. Hernia: No hernia is present. Genitourinary:     Penis: Normal. No phimosis, hypospadias or tenderness. Testes: Normal.         Right: Mass, tenderness or swelling not present. Left: Mass, tenderness or swelling not present. Epididymis:      Right: Normal. No mass or tenderness. Left: Normal. No mass or tenderness. Prostate: Enlarged (1+, only lower half palpable). Not tender and no nodules present. Rectum: Normal.   Musculoskeletal: Normal range of motion. Lymphadenopathy:      Cervical: No cervical adenopathy. Upper Body:      Right upper body: No supraclavicular adenopathy. Left upper body: No supraclavicular adenopathy. Skin:     General: Skin is warm and dry. Neurological:      General: No focal deficit present. Mental Status: He is alert and oriented to person, place, and time. Psychiatric:         Mood and Affect: Mood normal.         Behavior: Behavior normal.                   ASSESSMENT and PLAN  Diagnoses and all orders for this visit:    1. Screening PSA (prostate specific antigen)    2.  Family history of prostate cancer  Assessment & Plan:  As recommended by AUA guidelines, we discussed the benefits and harms of PSA testing and emphasized that men age 53-78 years at average risk, with no family history of prostate cancer and not , are those most likely to benefit, especially if they have an anticipated life span of more than 10-15 years. He is younger than recommended for routine screening however a baseline PSA may be helpful. If <0.5 I would defer next PSA 5 years. I would emphasize that those at higher risk (with a family history of prostate cancer, genetic predisposition and  men) are more likely to benefit from screening. He has no prior PSA test.     Noncancerous factors can cause elevated serum PSA levels, such as prostatitis, BPH, instrumentation, and trauma. He wishes to proceed to a PSA test    Orders:  -     AMB POC URINALYSIS DIP STICK AUTO W/O MICRO  -     PSA, DIAGNOSTIC (PROSTATE SPECIFIC AG)    3.  BPH without urinary obstruction  Assessment & Plan:  No bothersome LUTS    Orders:  -     PSA, DIAGNOSTIC (Lattie Sami)             Laurence Hameed MD

## 2021-04-21 NOTE — ASSESSMENT & PLAN NOTE
As recommended by AUA guidelines, we discussed the benefits and harms of PSA testing and emphasized that men age 53-78 years at average risk, with no family history of prostate cancer and not , are those most likely to benefit, especially if they have an anticipated life span of more than 10-15 years. He is younger than recommended for routine screening however a baseline PSA may be helpful. If <0.5 I would defer next PSA 5 years. I would emphasize that those at higher risk (with a family history of prostate cancer, genetic predisposition and  men) are more likely to benefit from screening. He has no prior PSA test.     Noncancerous factors can cause elevated serum PSA levels, such as prostatitis, BPH, instrumentation, and trauma.      He wishes to proceed to a PSA test

## 2021-04-21 NOTE — PROGRESS NOTES
1. Have you been to the ER, urgent care clinic since your last visit? Hospitalized since your last visit? No    2. Have you seen or consulted any other health care providers outside of the [address] [place] [address] since your last visit? Include any pap smears or colon screening.  No  Chief Complaint   Patient presents with    New Patient    Other     Just wants to get checked due to family history        Visit Vitals  /88 (BP 1 Location: Right arm, BP Patient Position: Sitting, BP Cuff Size: Adult)   Pulse 66   Temp 98.6 °F (37 °C) (Temporal)   Resp 24   Ht 5' 8\" (1.727 m)   Wt 302 lb (137 kg)   SpO2 98%   BMI 45.92 kg/m²

## 2021-04-22 LAB — PSA SERPL-MCNC: 0.3 NG/ML (ref 0–4)

## 2021-05-01 DIAGNOSIS — E78.2 MIXED HYPERLIPIDEMIA: ICD-10-CM

## 2021-05-01 DIAGNOSIS — I10 ESSENTIAL HYPERTENSION: ICD-10-CM

## 2021-05-01 RX ORDER — SIMVASTATIN 20 MG/1
TABLET, FILM COATED ORAL
Qty: 90 TAB | Refills: 3 | Status: SHIPPED | OUTPATIENT
Start: 2021-05-01 | End: 2021-07-08 | Stop reason: SDUPTHER

## 2021-06-21 LAB
BUN SERPL-MCNC: 10 MG/DL (ref 6–24)
BUN/CREAT SERPL: 14 (ref 9–20)
CALCIUM SERPL-MCNC: 9 MG/DL (ref 8.7–10.2)
CHLORIDE SERPL-SCNC: 103 MMOL/L (ref 96–106)
CO2 SERPL-SCNC: 24 MMOL/L (ref 20–29)
CREAT SERPL-MCNC: 0.7 MG/DL (ref 0.76–1.27)
EST. AVERAGE GLUCOSE BLD GHB EST-MCNC: 326 MG/DL
GLUCOSE SERPL-MCNC: 220 MG/DL (ref 65–99)
HBA1C MFR BLD: 13 % (ref 4.8–5.6)
POTASSIUM SERPL-SCNC: 3.9 MMOL/L (ref 3.5–5.2)
SODIUM SERPL-SCNC: 141 MMOL/L (ref 134–144)

## 2021-07-08 ENCOUNTER — OFFICE VISIT (OUTPATIENT)
Dept: ENDOCRINOLOGY | Age: 41
End: 2021-07-08
Payer: MEDICAID

## 2021-07-08 VITALS
TEMPERATURE: 97.8 F | DIASTOLIC BLOOD PRESSURE: 85 MMHG | SYSTOLIC BLOOD PRESSURE: 133 MMHG | HEIGHT: 68 IN | BODY MASS INDEX: 45.31 KG/M2 | HEART RATE: 80 BPM | OXYGEN SATURATION: 91 % | WEIGHT: 299 LBS

## 2021-07-08 DIAGNOSIS — Z79.4 TYPE 2 DIABETES MELLITUS WITH HYPERGLYCEMIA, WITH LONG-TERM CURRENT USE OF INSULIN (HCC): ICD-10-CM

## 2021-07-08 DIAGNOSIS — E11.65 TYPE 2 DIABETES MELLITUS WITH HYPERGLYCEMIA, UNSPECIFIED WHETHER LONG TERM INSULIN USE (HCC): ICD-10-CM

## 2021-07-08 DIAGNOSIS — I10 ESSENTIAL HYPERTENSION: ICD-10-CM

## 2021-07-08 DIAGNOSIS — E66.01 MORBID OBESITY (HCC): ICD-10-CM

## 2021-07-08 DIAGNOSIS — E11.65 TYPE 2 DIABETES MELLITUS WITH HYPERGLYCEMIA, UNSPECIFIED WHETHER LONG TERM INSULIN USE (HCC): Primary | ICD-10-CM

## 2021-07-08 DIAGNOSIS — E78.2 MIXED HYPERLIPIDEMIA: ICD-10-CM

## 2021-07-08 DIAGNOSIS — E11.65 TYPE 2 DIABETES MELLITUS WITH HYPERGLYCEMIA, WITH LONG-TERM CURRENT USE OF INSULIN (HCC): ICD-10-CM

## 2021-07-08 PROCEDURE — 99214 OFFICE O/P EST MOD 30 MIN: CPT | Performed by: INTERNAL MEDICINE

## 2021-07-08 RX ORDER — METFORMIN HYDROCHLORIDE 1000 MG/1
1000 TABLET ORAL 2 TIMES DAILY WITH MEALS
Qty: 180 TABLET | Refills: 3 | Status: SHIPPED | OUTPATIENT
Start: 2021-07-08

## 2021-07-08 RX ORDER — SIMVASTATIN 20 MG/1
TABLET, FILM COATED ORAL
Qty: 90 TABLET | Refills: 3 | Status: SHIPPED | OUTPATIENT
Start: 2021-07-08 | End: 2021-10-11 | Stop reason: SDUPTHER

## 2021-07-08 RX ORDER — INSULIN GLARGINE 100 [IU]/ML
INJECTION, SOLUTION SUBCUTANEOUS
Qty: 90 ML | Refills: 3 | Status: SHIPPED | OUTPATIENT
Start: 2021-07-08 | End: 2022-08-09

## 2021-07-08 NOTE — PROGRESS NOTES
[facility]               Cecile Esquivel MD      Patient Information  Date:7/8/2021  Name : Anastacio Pryor 42 y.o.     YOB: 1980         Referred by: MD Frank Crain MD      Chief Complaint   Patient presents with    Diabetes     History of Present Illness: Anastacio Pryor is a 42 y.o. male here for fu  of  Type 2 Diabetes Mellitus. He is still drinking sodas, very limited activity, recently started working in [employer]  He is not checking the blood glucose as recommended, did not bring the meter. He is still taking 60 units of Lantus, the prescription has been written for 80 units. Tolerating Victoza    No exercise  No blurry vision,no pain the feet   History of nonadherence to diet      No fever, cough, shortness of breath      History from February 2019  When ever insulin is expensive he stops and then has hyperglycemia , goes to ER . I have discussed with him several times about getting Lantus for less than $10 with coupon and given coupon  Also discussed about [employer] brand insulin    He has severe insulin resistance. Wt Readings from Last 3 Encounters:   07/08/21 299 lb (135.6 kg)   04/21/21 302 lb (137 kg)   03/19/21 302 lb (137 kg)       BP Readings from Last 3 Encounters:   07/08/21 133/85   04/21/21 138/88   03/19/21 113/73           Past Medical History:   Diagnosis Date    Bronchitis     Diabetes ([address]     HTN (hypertension)     Hyperlipidemia      Current Outpatient Medications   Medication Sig    simvastatin (ZOCOR) 20 mg tablet TAKE 1 TABLET BY MOUTH EVERY DAY AT NIGHT    Insulin Needles, Disposable, (Bozena Pen Needle) 32 gauge x 5/32\" ndle USE TO INJECT INSULIN AND VICTOZA DAILY Dx Code: E11.65    pioglitazone (ACTOS) 15 mg tablet Take 1 Tab by mouth daily.  liraglutide (VICTOZA) 0.6 mg/0.1 mL (18 mg/3 mL) pnij Inject in AM 1.2 mg daily.     insulin glargine (Lantus Solostar U-100 Insulin) 100 unit/mL (3 mL) inpn INJECT AS DIRECTED UP  UNITS PER DAY (Patient taking differently: 80 Units by SubCUTAneous route nightly. INJECT AS DIRECTED UP  UNITS PER DAY)    metFORMIN (GLUCOPHAGE) 1,000 mg tablet Take 1 Tab by mouth two (2) times daily (with meals).  glucose blood VI test strips (ONETOUCH ULTRA TEST) strip Test blood glucose three times daily Dx Code: E11.65    lancets (ONE TOUCH DELICA) 33 gauge misc Test blood glucose three times daily Dx Code: E11.65    Blood-Glucose Meter (ONETOUCH ULTRAMINI) monitoring kit Test blood glucose three times daily Dx Code: E11.65    metoprolol tartrate (LOPRESSOR) 50 mg tablet Take 1 Tab by mouth daily.  hydroCHLOROthiazide (HYDRODIURIL) 25 mg tablet TAKE 1 TABLET DAILY    cyclobenzaprine (FLEXERIL) 10 mg tablet TAKE 1 TABLET TWICE A DAY AS NEEDED    furosemide (LASIX) 20 mg tablet Take 1 tab every 3 days as neeed     No current facility-administered medications for this visit. Allergies   Allergen Reactions    Lisinopril Swelling         Review of Systems:  - Per HPI  -   -     Physical Examination:   Blood pressure 133/85, pulse 80, temperature 97.8 °F (36.6 °C), height 5' 8\" (1.727 m), weight 299 lb (135.6 kg), SpO2 91 %. Estimated body mass index is 45.46 kg/m² as calculated from the following:    Height as of this encounter: 5' 8\" (1.727 m). -   Weight as of this encounter: 299 lb (135.6 kg).    - General: pleasant, no distress, good eye contact  - HEENT: no exopthalmos, no periorbital edema, no scleral/conjunctival injection, EOMI, no lid lag or stare  - Neck: supple, no thyromegaly,   - Cardiovascular: regular, normal rate, normal S1 and S2,  - Respiratory: clear to auscultation bilaterally, no respiratory distress  -   - Musculoskeletal: no proximal muscle weakness in upper or lower extremities  -   - Neurological:alert and oriented , no focal deficits  - Psychiatric: normal mood and affect   - Skin: Normal turgor, no rash    Diabetic feet exam ; July 2021    H/o partial or complete amputation of foot : No  H/o previous foot ulceration : No  H/o pre - ulcerative callus : No  H/o peripheral neuropathy and callus : No  H/o poor circulation  : No  Foot deformity : Yes, flatfeet, callus        Lab Results   Component Value Date/Time    Hemoglobin A1c 13.0 (H) 06/18/2021 12:00 AM    Hemoglobin A1c 13.1 (H) 03/17/2021 11:49 AM    Hemoglobin A1c >14.0 (H) 12/03/2020 11:33 AM    Glucose 220 (H) 06/18/2021 12:00 AM    Glucose  04/11/2019 09:02 AM    Microalbumin/Creat ratio (mg/g creat)  11/19/2019 10:52 AM     Cannot calculate ratio due to microalbumin result outside reportable range. Microalbumin,urine random <0.50 11/19/2019 10:52 AM    LDL,Direct 132 (H) 04/11/2019 10:06 AM    LDL, calculated 81 05/27/2016 10:24 AM    Creatinine 0.70 (L) 06/18/2021 12:00 AM    Hemoglobin A1c, External 7.0 02/08/2017 12:00 AM    Hemoglobin A1c, External 7.30 11/13/2014 04:29 AM      Lab Results   Component Value Date/Time    Cholesterol, total 141 05/27/2016 10:24 AM    HDL Cholesterol 43 05/27/2016 10:24 AM    LDL,Direct 132 (H) 04/11/2019 10:06 AM    LDL, calculated 81 05/27/2016 10:24 AM    Triglyceride 83 05/27/2016 10:24 AM     Lab Results   Component Value Date/Time    ALT (SGPT) 27 12/03/2020 11:33 AM    Alk. phosphatase 104 12/03/2020 11:33 AM    Bilirubin, total 0.6 12/03/2020 11:33 AM     Lab Results   Component Value Date/Time    GFR est  06/18/2021 12:00 AM    GFR est non- 06/18/2021 12:00 AM    Creatinine 0.70 (L) 06/18/2021 12:00 AM    BUN 10 06/18/2021 12:00 AM    Sodium 141 06/18/2021 12:00 AM    Potassium 3.9 06/18/2021 12:00 AM    Chloride 103 06/18/2021 12:00 AM    CO2 24 06/18/2021 12:00 AM      Lab Results   Component Value Date/Time    TSH 1.050 07/09/2015 08:10 AM        Assessment/Plan:     1. Type 2 diabetes mellitus with hyperglycemia, unspecified whether long term insulin use ([facility]    2. Essential hypertension        1.  Type 2 Diabetes Mellitus with no nephropathy,neuropathy,retinopathy  Lab Results   Component Value Date/Time    Hemoglobin A1c 13.0 (H) 06/18/2021 12:00 AM    Hemoglobin A1c (POC) >15 04/11/2019 09:03 AM    Hemoglobin A1c, External 7.0 02/08/2017 12:00 AM   poorly controlled DM , non adherent to diet as well as checking the blood glucose. Lantus 100 units,  Diet is high in carbohydrates, counseled again  victoza  Weight loss stressed  metformin  DKA x2   Flavored water, avoid juices , cut down on snack portions  Encouraged to lose weight    2. HTN : Continue current therapy , CPAP     Anaphylaxis with ACE     3. Hyperlipidemia : Continue statin. 4.Obesity:Body mass index is 45.46 kg/m². Discussed about the importance of exercise and carbohydrate portion control. TSH normal   Late-night DST normal   He is on a very high calorie diet    5 EMRE - CPAP -       Patient Instructions   Lantus 80 units at night     Victoza 1.2 mg in AM     Continue metformin , Actos         Thank you for allowing me to participate in the care of this patient.     Ai Roman MD

## 2021-07-08 NOTE — LETTER
7/8/2021    Patient: Semaj Royal   YOB: 1980   Date of Visit: 7/8/2021     Olvin Bustamante MD  [facility] [address]  Via Fax: [phone]    Dear Olvin Bustamante MD,      Thank you for referring Mr. Semaj Royal to [address] [place] [address] for evaluation. My notes for this consultation are attached. If you have questions, please do not hesitate to call me. I look forward to following your patient along with you.       Sincerely,    Kodi Diana MD

## 2021-07-08 NOTE — PATIENT INSTRUCTIONS
Lantus 100 units at night , split it in 50 units and take at two different sites     Victoza 1.2 mg in AM     Continue metformin , Actos

## 2021-10-05 LAB
ALBUMIN/CREAT UR: 11 MG/G CREAT (ref 0–29)
BUN SERPL-MCNC: 10 MG/DL (ref 6–24)
BUN/CREAT SERPL: 19 (ref 9–20)
CALCIUM SERPL-MCNC: 8.9 MG/DL (ref 8.7–10.2)
CHLORIDE SERPL-SCNC: 105 MMOL/L (ref 96–106)
CO2 SERPL-SCNC: 24 MMOL/L (ref 20–29)
CREAT SERPL-MCNC: 0.54 MG/DL (ref 0.76–1.27)
CREAT UR-MCNC: 186.7 MG/DL
EST. AVERAGE GLUCOSE BLD GHB EST-MCNC: 324 MG/DL
GLUCOSE SERPL-MCNC: 178 MG/DL (ref 65–99)
HBA1C MFR BLD: 12.9 % (ref 4.8–5.6)
LDLC SERPL DIRECT ASSAY-MCNC: 101 MG/DL (ref 0–99)
MICROALBUMIN UR-MCNC: 19.8 UG/ML
POTASSIUM SERPL-SCNC: 4.1 MMOL/L (ref 3.5–5.2)
SODIUM SERPL-SCNC: 141 MMOL/L (ref 134–144)

## 2021-10-11 ENCOUNTER — OFFICE VISIT (OUTPATIENT)
Dept: ENDOCRINOLOGY | Age: 41
End: 2021-10-11
Payer: MEDICAID

## 2021-10-11 VITALS
SYSTOLIC BLOOD PRESSURE: 135 MMHG | HEART RATE: 81 BPM | OXYGEN SATURATION: 95 % | HEIGHT: 68 IN | BODY MASS INDEX: 46.23 KG/M2 | TEMPERATURE: 98.1 F | DIASTOLIC BLOOD PRESSURE: 91 MMHG | WEIGHT: 305 LBS | RESPIRATION RATE: 24 BRPM

## 2021-10-11 DIAGNOSIS — E78.2 MIXED HYPERLIPIDEMIA: ICD-10-CM

## 2021-10-11 DIAGNOSIS — I10 ESSENTIAL HYPERTENSION: ICD-10-CM

## 2021-10-11 DIAGNOSIS — E11.65 TYPE 2 DIABETES MELLITUS WITH HYPERGLYCEMIA, WITH LONG-TERM CURRENT USE OF INSULIN (HCC): Primary | ICD-10-CM

## 2021-10-11 DIAGNOSIS — Z79.4 TYPE 2 DIABETES MELLITUS WITH HYPERGLYCEMIA, WITH LONG-TERM CURRENT USE OF INSULIN (HCC): Primary | ICD-10-CM

## 2021-10-11 PROCEDURE — 99215 OFFICE O/P EST HI 40 MIN: CPT | Performed by: INTERNAL MEDICINE

## 2021-10-11 RX ORDER — SIMVASTATIN 20 MG/1
TABLET, FILM COATED ORAL
Qty: 90 TABLET | Refills: 3 | Status: SHIPPED | OUTPATIENT
Start: 2021-10-11

## 2021-10-11 NOTE — PROGRESS NOTES
Valdemar Bailon is a 42 y.o. male here for   Chief Complaint   Patient presents with    Diabetes       1. Have you been to the ER, urgent care clinic since your last visit? Hospitalized since your last visit? -no    2. Have you seen or consulted any other health care providers outside of the [address] [place] [address] since your last visit? Include any pap smears or colon screening. -PCP

## 2021-10-11 NOTE — LETTER
10/11/2021    Patient: Ranjit Diane   YOB: 1980   Date of Visit: 10/11/2021     Zaki Burnett MD  [facility] [address]  Via Fax: [phone]    Dear Zaki Burnett MD,      Thank you for referring Mr. Ranjit Diane to [address] [place] [address] for evaluation. My notes for this consultation are attached. If you have questions, please do not hesitate to call me. I look forward to following your patient along with you.       Sincerely,    Molina Luciano MD

## 2021-10-11 NOTE — PROGRESS NOTES
Hubert Guillen MD      Patient Information  Date:10/11/2021  Name : Douglas Denny 42 y.o.     YOB: 1980         Referred by: MD Kunal Causey MD      Chief Complaint   Patient presents with    Diabetes     History of Present Illness: Douglas Denny is a 42 y.o. male here for fu  of  Type 2 Diabetes Mellitus. Type 2 diabetes mellitus: On Lantus, Victoza, Metformin, Actos  Did not bring the meter, reportedly checking the blood glucose and fasting more than 200  He denies drinking sodas, very limited activity, he is working in [employer]  Reports to be taking Lantus  Tolerating Victoza    No exercise  No blurry vision,no pain the feet   History of nonadherence to diet      No fever, cough, shortness of breath      History from February 2019  When ever insulin is expensive he stops and then has hyperglycemia , goes to ER . I have discussed with him several times about getting Lantus for less than $10 with coupon and given coupon  Also discussed about [employer] brand insulin    He has severe insulin resistance. Wt Readings from Last 3 Encounters:   10/11/21 305 lb (138.3 kg)   07/08/21 299 lb (135.6 kg)   04/21/21 302 lb (137 kg)       BP Readings from Last 3 Encounters:   10/11/21 (!) 135/91   07/08/21 133/85   04/21/21 138/88           Past Medical History:   Diagnosis Date    Bronchitis     Diabetes ([address]     HTN (hypertension)     Hyperlipidemia      Current Outpatient Medications   Medication Sig    insulin glargine (Lantus Solostar U-100 Insulin) 100 unit/mL (3 mL) inpn INJECT 100 UNITS AT NIGHT    metFORMIN (GLUCOPHAGE) 1,000 mg tablet Take 1 Tablet by mouth two (2) times daily (with meals).  liraglutide (VICTOZA) 0.6 mg/0.1 mL (18 mg/3 mL) pnij Inject in AM 1.2 mg daily.     Insulin Needles, Disposable, (Bozena Pen Needle) 32 gauge x 5/32\" ndle USE TO INJECT INSULIN AND VICTOZA DAILY Dx Code: P91.85    pioglitazone (ACTOS) 15 mg tablet Take 1 Tab by mouth daily.  glucose blood VI test strips (ONETOUCH ULTRA TEST) strip Test blood glucose three times daily Dx Code: E11.65    lancets (ONE TOUCH DELICA) 33 gauge misc Test blood glucose three times daily Dx Code: E11.65    Blood-Glucose Meter (ONETOUCH ULTRAMINI) monitoring kit Test blood glucose three times daily Dx Code: E11.65    metoprolol tartrate (LOPRESSOR) 50 mg tablet Take 1 Tab by mouth daily.  hydroCHLOROthiazide (HYDRODIURIL) 25 mg tablet TAKE 1 TABLET DAILY    cyclobenzaprine (FLEXERIL) 10 mg tablet TAKE 1 TABLET TWICE A DAY AS NEEDED    furosemide (LASIX) 20 mg tablet Take 1 tab every 3 days as neeed    simvastatin (ZOCOR) 20 mg tablet TAKE 1 TABLET BY MOUTH EVERY DAY AT NIGHT     No current facility-administered medications for this visit. Allergies   Allergen Reactions    Lisinopril Swelling         Review of Systems:  - Per HPI  -   -     Physical Examination:   Blood pressure (!) 135/91, pulse 81, temperature 98.1 °F (36.7 °C), temperature source Temporal, resp. rate 24, height 5' 8\" (1.727 m), weight 305 lb (138.3 kg), SpO2 95 %. Estimated body mass index is 46.38 kg/m² as calculated from the following:    Height as of this encounter: 5' 8\" (1.727 m). -   Weight as of this encounter: 305 lb (138.3 kg).    - General: pleasant, no distress, good eye contact  - HEENT: no exopthalmos, no periorbital edema, no scleral/conjunctival injection, EOMI, no lid lag or stare  - Neck: supple, no thyromegaly,   - Cardiovascular: regular, normal rate, normal S1 and S2,  - Respiratory: clear to auscultation bilaterally, no respiratory distress  - Abdomen: No lipodystrophy  - Musculoskeletal: no proximal muscle weakness in upper or lower extremities  -   - Neurological:alert and oriented , no focal deficits  - Psychiatric: normal mood and affect   - Skin: Normal turgor, no rash    Diabetic feet exam ; July 2021    H/o partial or complete amputation of foot : No  H/o previous foot ulceration : No  H/o pre - ulcerative callus : No  H/o peripheral neuropathy and callus : No  H/o poor circulation  : No  Foot deformity : Yes, flatfeet, callus        Lab Results   Component Value Date/Time    Hemoglobin A1c 12.9 (H) 10/04/2021 12:00 AM    Hemoglobin A1c 13.0 (H) 06/18/2021 12:00 AM    Hemoglobin A1c 13.1 (H) 03/17/2021 11:49 AM    Glucose 178 (H) 10/04/2021 12:00 AM    Glucose  04/11/2019 09:02 AM    Microalbumin/Creat ratio (mg/g creat)  11/19/2019 10:52 AM     Cannot calculate ratio due to microalbumin result outside reportable range. Microalb/Creat ratio (ug/mg creat.) 11 10/04/2021 12:00 AM    Microalbumin,urine random <0.50 11/19/2019 10:52 AM    LDL,Direct 101 (H) 10/04/2021 12:00 AM    LDL, calculated 81 05/27/2016 10:24 AM    Creatinine 0.54 (L) 10/04/2021 12:00 AM    Hemoglobin A1c, External 7.0 02/08/2017 12:00 AM    Hemoglobin A1c, External 7.30 11/13/2014 04:29 AM      Lab Results   Component Value Date/Time    Cholesterol, total 141 05/27/2016 10:24 AM    HDL Cholesterol 43 05/27/2016 10:24 AM    LDL,Direct 101 (H) 10/04/2021 12:00 AM    LDL, calculated 81 05/27/2016 10:24 AM    Triglyceride 83 05/27/2016 10:24 AM     Lab Results   Component Value Date/Time    ALT (SGPT) 27 12/03/2020 11:33 AM    Alk. phosphatase 104 12/03/2020 11:33 AM    Bilirubin, total 0.6 12/03/2020 11:33 AM     Lab Results   Component Value Date/Time    GFR est  10/04/2021 12:00 AM    GFR est non- 10/04/2021 12:00 AM    Creatinine 0.54 (L) 10/04/2021 12:00 AM    BUN 10 10/04/2021 12:00 AM    Sodium 141 10/04/2021 12:00 AM    Potassium 4.1 10/04/2021 12:00 AM    Chloride 105 10/04/2021 12:00 AM    CO2 24 10/04/2021 12:00 AM      Lab Results   Component Value Date/Time    TSH 1.050 07/09/2015 08:10 AM        Assessment/Plan:     1. Type 2 diabetes mellitus with hyperglycemia, with long-term current use of insulin ([facility]    2. Essential hypertension    3.  Mixed hyperlipidemia        1. Type 2 Diabetes Mellitus  Lab Results   Component Value Date/Time    Hemoglobin A1c 12.9 (H) 10/04/2021 12:00 AM    Hemoglobin A1c (POC) >15 04/11/2019 09:03 AM    Hemoglobin A1c, External 7.0 02/08/2017 12:00 AM   poorly controlled DM , likely non adherent to diet as well as checking the blood glucose. He is taking insulin accurately, demonstrated in the office on his own  Lantus 100 units,  Counseled again, referral to the dietitian,  Hannah Umanzor  Weight loss stressed  metformin  DKA x2, REGAN negative  Flavored water, avoid juices , cut down on snack portions  We will get medication filling pattern from pharmacy    2. HTN : Continue current therapy , CPAP     Anaphylaxis with ACE     3. Hyperlipidemia : Continue statin. 4.Obesity:Body mass index is 46.38 kg/m². Discussed about the importance of exercise and carbohydrate portion control. TSH normal   Late-night DST normal       5 EMRE - CPAP -     Spent > 40 minutes on the day of the visit reviewing chart, examining, ordering/reviewing labs, counseling, discussing therapeutics and documentation in the medical record    There are no Patient Instructions on file for this visit. Follow-up and Dispositions    · Return in about 4 months (around 2/11/2022) for labs before next visit and follow up. Thank you for allowing me to participate in the care of this patient.     Gino Pat MD

## 2021-10-19 DIAGNOSIS — E11.65 TYPE 2 DIABETES MELLITUS WITH HYPERGLYCEMIA, WITH LONG-TERM CURRENT USE OF INSULIN (HCC): ICD-10-CM

## 2021-10-19 DIAGNOSIS — Z79.4 TYPE 2 DIABETES MELLITUS WITH HYPERGLYCEMIA, WITH LONG-TERM CURRENT USE OF INSULIN (HCC): ICD-10-CM

## 2021-10-19 RX ORDER — PEN NEEDLE, DIABETIC 31 GX3/16"
NEEDLE, DISPOSABLE MISCELLANEOUS
Qty: 100 PEN NEEDLE | Refills: 5 | Status: SHIPPED | OUTPATIENT
Start: 2021-10-19

## 2021-10-25 ENCOUNTER — CLINICAL SUPPORT (OUTPATIENT)
Dept: DIABETES SERVICES | Age: 41
End: 2021-10-25
Payer: MEDICAID

## 2021-10-25 DIAGNOSIS — Z79.4 TYPE 2 DIABETES MELLITUS WITH HYPERGLYCEMIA, WITH LONG-TERM CURRENT USE OF INSULIN (HCC): Primary | ICD-10-CM

## 2021-10-25 DIAGNOSIS — E11.65 TYPE 2 DIABETES MELLITUS WITH HYPERGLYCEMIA, WITH LONG-TERM CURRENT USE OF INSULIN (HCC): Primary | ICD-10-CM

## 2021-10-25 PROCEDURE — G0108 DIAB MANAGE TRN  PER INDIV: HCPCS

## 2021-10-25 NOTE — PROGRESS NOTES
[facility]  Diabetes Self-Management Education & Support Program  Pre-program Assessment    Reason for Referral: Diabetes Education  Referral Source: Krysta Rosario MD  Services requested: DSMES    ASSESSMENT    From my perspective, the participant would benefit from [facility] specifically related to Reducing risks, Healthy eating, Monitoring, Physical activity, Taking medications, Healthy coping and Problem solving. Will adapt DSMES program to build on participant's skills score, confidence score and preparedness score as noted in the Diabetes Skills, Confidence, and Preparedness Index. During the program, we will focus on providing DSMES that specifically addresses participant's interest in Healthy eating, Physical activity, Healthy coping and Problem solving, as shown by their reported readiness to change. The participant would be best served by attending weekly group class series. Diabetes Self-Management Education Follow-up Visit: November 3, 2021         Clinical Presentation  Thom Bocanegra is a 42 y.o.  male referred for diabetes self-management education. Participant has Type 2 DM on insulin for 11-20 years. Family history positive for diabetes. Patient reports they are unsure if they have received DSMES services in the past. Most recent A1c value:   Lab Results   Component Value Date/Time    Hemoglobin A1c 12.9 (H) 10/04/2021 12:00 AM    Hemoglobin A1c (POC) >15 04/11/2019 09:03 AM    Hemoglobin A1c, External 7.0 02/08/2017 12:00 AM       Diabetes-related medical history:    Diabetes-related medications:  Current dosing:   Key Antihyperglycemic Medications             insulin glargine (Lantus Solostar U-100 Insulin) 100 unit/mL (3 mL) inpn INJECT 100 UNITS AT NIGHT    metFORMIN (GLUCOPHAGE) 1,000 mg tablet Take 1 Tablet by mouth two (2) times daily (with meals). liraglutide (VICTOZA) 0.6 mg/0.1 mL (18 mg/3 mL) pnij Inject in AM 1.2 mg daily. pioglitazone (ACTOS) 15 mg tablet Take 1 Tab by mouth daily. Blood Pressure Management  Key ACE/ARB Medications     Patient is on no ACE or ARB meds. Lipid Management  Key Antihyperlipidemia Meds             simvastatin (ZOCOR) 20 mg tablet TAKE 1 TABLET BY MOUTH EVERY DAY AT NIGHT          Clot Prevention  Key Anti-Platelet Anticoagulant Meds     The patient is on no antiplatelet meds or anticoagulants. Learning Assessment  Learning objectives Educator assessment (10/25/2021)   Diabetes Disease Process  The participant can   A) describe diabetes in basic terms;   B) state the type of diabetes they have; &   C) state accepted blood glucose targets. Healthy Eating  The participant can   A) identify carbohydrate foods; &   B) accurately read food labels. Being Active  The participant can  A) state the benefits of physical activity;  B) report their current PA practices;  C) identify PA they would consider incorporating in their lives; &  D) develop an implementation plan. Monitoring  The participant can  A) operate their blood glucose meter; &  B) describe how they log their blood glucoses to share with their provider. Taking Medications  The participant can  A) name their diabetes medications;  B) state the purpose and dose;  C) note side effects; &  D) describe proper storage, disposal & transport (if appropriate). Healthy Coping  The participant can    A) describe their response to diabetes diagnosis; B) describe their specific coping mechanisms;  C) identify supportive people and/or other resources that positively support their diabetes self-care and health. Reducing Risks  The participant can describe the preventive measures used by providers to promote health and prevent diabetes complications.      Problem Solving  The participant can   A) identify signs, symptoms & treatment of hypoglycemia;   B) identify signs, symptoms & treatment of hyperglycemia;  C) describe their sick day plan; &  D) identify BG patterns to discuss with their provider. No, \"when my sugar is high\"  Yes  No        No  Yes        Yes  Yes  Yes  Yes        Yes  Yes          No  No  No  No      Yes, \"Shocked\"  No  Yes        Yes, \"check my feet, and eyes\"          Yes  Yes  No  No     Characteristics to Learning   Barriers to Learning   [] Cognitive loss  [] Mental retardation   [] Intellectual delay/cognitive impairment  [] Psychiatric disorder  [] Visually impaired  [] Hearing loss                 [] Low literacy (difficulty with written text)  [] Low numeracy (difficulty with mathematical information  [] Low health literacy (difficulty with understanding health information & services  [] Language  [] Functional limitation  [] Pain   [] Financial  [] Transportation  [] None   Favorite Ways to Learn   [] Lecture  [] Slides  [] Reading [] Video-Internet  [] Cassettes/CDs/MP3's  [] Interactive Small Groups [] Other       Behavioral Assessment  Current self-care practices  Educator assessment (10/25/2021)   Healthy Eating  Current practices  24-hour Dietary Recall:  Breakfast: Sausage McMuffin,  Lunch: Tuna sandwich, chips  Dinner: Stir chamorro, fish  Snacks: nuts  Beverages: water  Alcohol: none     Would benefit from DSMES related to Healthy Eating: Yes      Eats a carbohydrate controlled diet: No      Stage of change: Preparation   Being Active  Current practices  How many days during the past week have you performed physical activity where your heart beats faster and your breathing is harder than normal for 30 minutes or more? 3 days    How many days in a typical week do you perform activity such as this?  3 days     Would benefit from [facility] related to Being Active: Yes      Exercises 150 minutes/week: No      Stage of change: Preparation     Monitoring  Current practices  Do you monitor your blood sugar? Yes    How often do you monitor? 1x/day    What are the range of readings?  150-170 mg/dL    Do you know your last A1c measurement? No    Do you know the meaning of the A1c? No     Would benefit from [facility] related to Monitoring: Yes      Uses BG readings to establish trends and understand BG patterns: Yes      Stage of change: Preparation   Taking Medication  Current practices  Do you understand what your diabetes medications do? No    How often do you miss doses of your diabetes medications? Never    Can you afford your diabetes medications? Yes   Would benefit from [facility] related to Taking Medication: Yes      Takes medications consistently to receive full benefit: Yes      Stage of change: Action       Healthy Coping   Current state  Diabetes Skills, Confidence and Preparedness Index: Total score:5.1  Skills: 5.4  Confidence: 4.7  Preparedness: 5.1   Would benefit from [facility] related to Healthy Coping: Yes      Identifies specific people, organizations,etc, that actively support their diabetes self-care efforts: Yes      Stage of change: Preparation     Reducing Risks  Current state  Vaccines:  Influenza:   Immunization History   Administered Date(s) Administered    Influenza Vaccine Berrybenka) PF (>6 Mo Flulaval, Fluarix, and >3 Yrs [address] [place] [address]) 09/27/2018, 09/04/2019       Pneumococcal: There is no immunization history for the selected administration types on file for this patient. Hepatitis: There is no immunization history for the selected administration types on file for this patient.     Examinations:  Diabetic Foot and Eye Exam HM Status   Topic Date Due    Eye Exam  01/10/2022    Diabetic Foot Care  07/08/2022        Dental exam: DUE    Foot exam: DUE    Heart Protection:  BP Readings from Last 2 Encounters:   10/11/21 (!) 135/91   07/08/21 133/85        Lab Results   Component Value Date/Time    LDL,Direct 101 (H) 10/04/2021 12:00 AM    LDL, calculated 81 05/27/2016 10:24 AM        Kidney Protection:  Lab Results   Component Value Date/Time    Microalbumin/Creat ratio (mg/g creat)  11/19/2019 10:52 AM     Cannot calculate ratio due to microalbumin result outside reportable range. Microalb/Creat ratio (ug/mg creat.) 11 10/04/2021 12:00 AM    Microalbumin,urine random <0.50 11/19/2019 10:52 AM        Would benefit from DSMES related to Reducing Risks: Yes      Actively participates in decision-making with provider regarding secondary prevention:  No      Stage of change: Preparation   Problem Solving  Current state  Hypoglycemia Management:  What are signs and symptoms of hypoglycemia that you experience? Shaking/trembling    How do you prevent hypoglycemia? Eat something    How do you treat hypoglycemia? Eat something    Hyperglycemia Management:  What are signs and symptoms of hyperglycemia that you experience? Frequent urination    How can you prevent hyperglycemia? Take medication as instructed    Sick Day Management:  What do you do differently on sick days? Pt reported being unaware of self-management on sick days    Pattern Management:  Do you notice blood glucose patterns when you look at the readings in your meter or logbook? No    How do you use the blood glucose readings from your meter or logbook? Pt reported being unaware of pattern management skills     Would benefit from [facility] related to Problem Solving: Yes      Articulates appropriate strategies to address hypoglycemia, hyperglycemia, sick day care and BG pattern: No      Stage of change: Preparation     Note: Content derived from the American Association of Diabetes Educators' Diabetes Education Curriculum: A Guide to Successful Self-Management (3rd edition)      Beth Stephens RN on 10/25/2021 at 3:20 PM    I have personally reviewed the health record, including provider notes, laboratory data and current medications before making these care and education recommendations. The time spent in this effort is included in the total time.   Total minutes: 30    Diabetes Skills, Confidence & Preparedness Index (SCPI) ©  Thank you for completing the Skills, Confidence & Preparedness Index! Below are your scores. All scales and questions are out of 7. If you would like these results emailed, please enter your email address along with some identifying patient information. Email:    Patient Identifier:        Overall SCPI score: 5.1 Skills Score: 5.4  Low: Healthy Coping(Q7),Blood Sugar Monitoring(Q8),Reducing Risks(Q9) Confidence Score: 4.7  Low: Blood Sugar Monitoring(Q6),Problem MDOTTYB(G3) Preparedness Score: 5.1  Low: Healthy Coping(Q3),Reducing Risks(Q4),Blood Sugar Monitoring(Q6)  Healthy Eating Score: 5.8  Low: Confidence(Q4) Taking Medication Score: 6.0  Low: Skills(Q2),Preparedness(Q5) Blood Sugar Monitoring Score: 4.6  Low: Confidence(Q6) Reducing Risks Score: 5.0  Low: Skills(Q9),Preparedness(Q4)  Problem Solving Score: 5.0  Low: Confidence(Q7) Healthy Coping Score: 4.3  Low: Skills(Q7),Preparedness(Q3) Being Active Score: 6.0  Low: Confidence(Q5),Preparedness(Q2)    Skills/Knowledge Questions  1. I know how to plan meals that have the best balance between carbohydrates, proteins and vegetables. 6  2. I know how my diabetes medications (pills, injectables and/or insulin) work in my body. 6  3. I know when to check my blood sugar if I want to see how my body responded to a meal. 6  4. I know when to check my blood sugars to determine if my medication or insulin doses are correct. 6  5. I know what to do to prevent a low blood sugar when I exercise (either before, during, or after). 7  6. When I am sick, I know what to do differently with my diabetes management. 6  7. I know how stress can affect my diabetes management. 4  8. When I look at my blood sugars over a given week, I can explain what my blood sugar pattern is. 4  9. I know what my target levels are for A1c, blood pressure and cholesterol. 4  Confidence Questions  1. I am confident that I can plan balanced meals and snacks. 6  2.  I am confident that I can manage my stress. 5  3. I am confident that I can prevent a low blood sugar during or after exercise. 5  4. I am confident that the next time I eat out, I will be able to choose foods that best keep my blood sugars in target. 5  5. I am confident I can include exercise into my schedule. 6  6. I am confident that I can use my daily blood sugars to adjust my diet, my activity, and/or my insulin. 3  7. When something out of my normal routine happens, I am confident that I can problem-solve and keep my diabetes on track. 3  Preparedness Questions  1. Within the next month, I will begin to eat more balanced meals and snacks. 6  2. Within the next month, I will choose an exercise activity and I will start fitting it into my schedule. 6  3. Within the next month, I will make a list of stress management options that work for me. 4  4. Within the next month, I will consistently plan ahead to prevent low blood sugars. 4  5. Within the next month, I will start adjusting my insulin doses on my own. 6  6. Within the next month, I will begin making changes to my diabetes management based on my daily blood sugars (eg - eating, activity and/or insulin). 4  7. Within the next month, I will begin making changes to my diabetes management to meet my overall goals (eg - eating, activity and/or insulin).  6

## 2021-11-02 ENCOUNTER — CLINICAL SUPPORT (OUTPATIENT)
Dept: DIABETES SERVICES | Age: 41
End: 2021-11-02
Payer: MEDICAID

## 2021-11-02 DIAGNOSIS — Z79.4 TYPE 2 DIABETES MELLITUS WITH HYPERGLYCEMIA, WITH LONG-TERM CURRENT USE OF INSULIN (HCC): Primary | ICD-10-CM

## 2021-11-02 DIAGNOSIS — E11.65 TYPE 2 DIABETES MELLITUS WITH HYPERGLYCEMIA, WITH LONG-TERM CURRENT USE OF INSULIN (HCC): Primary | ICD-10-CM

## 2021-11-02 PROCEDURE — G0109 DIAB MANAGE TRN IND/GROUP: HCPCS | Performed by: DIETITIAN, REGISTERED

## 2021-11-03 NOTE — PROGRESS NOTES
[facility]  Diabetes Self-Management Education & Support Program  Encounter note    SUMMARY  Diabetes self-care management training was completed related to Reducing risks. The participant will return on November 9 to continue DSMES related to Healthy eating and Monitoring. The participant did identify SMART Goal(s): - see below, and will practice knowledge and skills related to reducing risks to improve diabetes self-management. EVALUATION:  Mr. Isabell Cazares identified persons on his diabetes care team; he regularly sees a podiatrist at [facility] and he recently had a diabetic foot exam and COVID-19 vaccine. He shared that he has some damage to his vision from his diabetes. He participated in group discussion re: diabetes complications and asked questions about amputations, kidney failure and dental care. He reports exercising for stress reduction. Of importance to note, Mr. Isabell Cazares feel asleep in the waiting room prior to class and dosed off during class a few times. During our discussion on EMRE, Mr. Isabell Cazares shared that he does get tired a lot and needs to sleep \"all the time\". He was encouraged to reach out to he PCP and ask for a sleep study test. He has s/sx of ERME AEB having difficulty staying awake during the day, reports of poor sleep pattern and difficulty concentrating. RECOMMENDATIONS:  1. Track meals and log blood sugars   2. Eat three meals per day  3. Inquire about a sleep study    Next provider visit is scheduled for November 9, DSME       SMART GOAL(S)  1. Will eat breakfast at least 3 times over the next 7 days. ACHIEVEMENT OF GOAL(S)  [] 0-24%     [] 25-49%     [] 50-74%     [] %       DATE DSMES TOPIC EVALUATION     11/2/2021 WHAT IS DIABETES?   a. Role of the normal pancreas in energy balance and blood glucose control   b. The defect seen in diabetes   c. Signs & symptoms of diabetes   d. Diagnosis of diabetes   e.  Types of diabetes   f. Blood glucose targets in non-pregnant & non-pregnant adults       The participant knows   Their type of diabetes Yes   The basic physiologic defect Yes   Blood glucose targets Yes     DATE DSMES TOPIC EVALUATION     11/2/2021 HOW DO I STAY HEALTHY?   a. Prevention    Vaccinations    Preconception care (if applicable)  b. Examinations    Eye     Dental   Foot   c. Diabetic complications' prevention    Heart health    Kidney Health    Nerve health    Sleep health      The participant has a personal diabetes care record to keep abreast of diabetes health Yes, He started completing his personal care record during class. The participant would benefit from completing his personal care record and inquiring about a sleep study for preventative health. Domo Price RD on 11/2/2021 at 8:43 PM    I have personally reviewed the health record, including provider notes, laboratory data and current medications before making these care and education recommendations. The time spent in this effort is included in the total time.   Total minutes: 120

## 2021-11-09 ENCOUNTER — CLINICAL SUPPORT (OUTPATIENT)
Dept: DIABETES SERVICES | Age: 41
End: 2021-11-09
Payer: MEDICAID

## 2021-11-09 DIAGNOSIS — Z79.4 TYPE 2 DIABETES MELLITUS WITH HYPERGLYCEMIA, WITH LONG-TERM CURRENT USE OF INSULIN (HCC): Primary | ICD-10-CM

## 2021-11-09 DIAGNOSIS — E11.65 TYPE 2 DIABETES MELLITUS WITH HYPERGLYCEMIA, WITH LONG-TERM CURRENT USE OF INSULIN (HCC): Primary | ICD-10-CM

## 2021-11-09 PROCEDURE — G0109 DIAB MANAGE TRN IND/GROUP: HCPCS | Performed by: DIETITIAN, REGISTERED

## 2021-11-10 NOTE — PROGRESS NOTES
[facility]  Diabetes Self-Management Education & Support Program  Encounter note    SUMMARY  Diabetes self-care management training was completed related to Healthy eating and Monitoring. The participant will return on November 16 to continue DSMES related to Monitoring and Physical activity. The participant did identify SMART Goal(s): - Practice building a balanced meal for dinner by counting 45 g CHO at least 3 times over the next week. , and will practice knowledge and skills related to reducing risks and healthy eating and monitoring to improve diabetes self-management. EVALUATION:  Cortez Stone shared that he started eating breakfast and he checks his glucose levels in the morning before he leaves the house. He was encouraged to check his fasting glucose level and explained that it should be checked when he wakes up-before he eats. Mr. Rebeca Akhtar demonstrated good understanding of designing a 45 g CHO meal using the healthy plate method and food models. RECOMMENDATIONS:  Weight himself weekly  Eat breakfast daily  Measure cereal and count CHO per servings  Check glucose levels every morning before he eats    Next provider visit is scheduled for DSME group class November 16, 2021       SMART GOAL(S)  Practice building a balanced meal for dinner by counting 45 g CHO at least 3 times over the next week.,  ACHIEVEMENT OF GOAL(S)  [] 0-24%     [] 25-49%     [] 50-74%     [] %       DATE DSMES TOPIC EVALUATION     11/9/2021 WHAT CAN I EAT?   a. General principles   b. Determining a healthy weight   c. Nutritional terms & tools    Healthy Plate method    Carbohydrate Counting    Reading food labels    Free apps   d. Pregnancy recommendations      The participant    Uses Healthy Plate principles in constructing meals Yes   Reads food labels in choosing acceptable foods Yes    The participant would benefit from practicing balanced healthy plate for breakfast, lunch and dinner. DATE DSMES TOPIC EVALUATION     11/9/2021 HOW CAN BLOOD GLUCOSE MONITORING HELP ME?   a. Value of blood glucose monitoring   b. Realistic expectations   c. Blood glucose monitoring targets   d. Target adjustments   e. Setting a1c & blood glucose targets with provider   f. Meter selection    g. Technique for obtaining blood droplet   h. Blood glucose testing sites   i. Determining best times to test   j. Pregnancy recommendations   k. Data sharing with provider        The participant    Can demonstrate their glucometer procedure Yes   Logs their BG readings Yes    The participant would benefit from logging his morning glucose levels. Sunny Benavides RD on 11/9/2021 at 7:39 PM    I have personally reviewed the health record, including provider notes, laboratory data and current medications before making these care and education recommendations. The time spent in this effort is included in the total time.   Total minutes: 120

## 2021-11-16 ENCOUNTER — CLINICAL SUPPORT (OUTPATIENT)
Dept: DIABETES SERVICES | Age: 41
End: 2021-11-16
Payer: MEDICAID

## 2021-11-16 DIAGNOSIS — Z79.4 TYPE 2 DIABETES MELLITUS WITH HYPERGLYCEMIA, WITH LONG-TERM CURRENT USE OF INSULIN (HCC): Primary | ICD-10-CM

## 2021-11-16 DIAGNOSIS — E11.65 TYPE 2 DIABETES MELLITUS WITH HYPERGLYCEMIA, WITH LONG-TERM CURRENT USE OF INSULIN (HCC): Primary | ICD-10-CM

## 2021-11-16 PROCEDURE — G0109 DIAB MANAGE TRN IND/GROUP: HCPCS | Performed by: DIETITIAN, REGISTERED

## 2021-11-23 ENCOUNTER — CLINICAL SUPPORT (OUTPATIENT)
Dept: DIABETES SERVICES | Age: 41
End: 2021-11-23
Payer: MEDICAID

## 2021-11-23 DIAGNOSIS — E11.65 TYPE 2 DIABETES MELLITUS WITH HYPERGLYCEMIA, WITH LONG-TERM CURRENT USE OF INSULIN (HCC): Primary | ICD-10-CM

## 2021-11-23 DIAGNOSIS — Z79.4 TYPE 2 DIABETES MELLITUS WITH HYPERGLYCEMIA, WITH LONG-TERM CURRENT USE OF INSULIN (HCC): Primary | ICD-10-CM

## 2021-11-23 PROCEDURE — G0109 DIAB MANAGE TRN IND/GROUP: HCPCS | Performed by: DIETITIAN, REGISTERED

## 2021-11-23 NOTE — PROGRESS NOTES
[facility]  Diabetes Self-Management Education & Support Program  Encounter note    SUMMARY  Diabetes self-care management training was completed related to Healthy coping and Problem solving. The participant will return on  for six week post program evaluation. The participant did identify SMART Goal(s): - see below, and will practice knowledge and skills related to reducing risks, healthy eating and monitoring, being active and medications and healthy coping and problem solving to improve diabetes self-management. EVALUATION:  Mr. Ramo Marcos shared feeling of sadness, anger and depression since his DM diagnosis. He has multiple family members that  from complications with DM. He shared that he no longer fears dying, but is fearful of kidney failure. He also shared a recent event that has left him needing to save money to fix his car in order to pass state inspection. He also shared feelings of \"being dealt a difficult hand in life\". Current societal events are causing him concern for his welfare. Mr. Ramo Marcos has many stressors that may be causing his glucose levels to be elevated. He participated in guided meditation and coloring as examples for stress management. He likes to work out in the gym to help alleviate stress. Mr. Ramo Marcos only checks his BG levels in the morning before he leaves the house and after he eats. His BG levels run between 200-245 mg/dL. This RDN has attempted to encourage Mr. Crespo to check fasting BG and before dinner. He did practice monitoring his BG levels after exercise. These numbers were not logged; he reports \"lower\" BG levels. Mr. Ramo Marcos displays a good understanding of needing to exercise, take medications consistently, monitor BG daily and to limit CHO intake during meals. He requires encouragement and reminders to initiate change in his routine. He worries about CKD and kidney failure. He may benefit from MNT follow up. RECOMMENDATIONS:  Monitor BG levels in AM, before eating breakfast daily. Increase  PA to 3-4 times per week and include resistance, balance and flexibility. Seek out talk therapy or support groups when feeling sad and overwhelmed with current societal issues. Next provider visit is scheduled for [facility] January 4, 2022 for six week follow up. SMART GOAL(S)  1. Monitor and track BG levels before breakfast and before dinner. ACHIEVEMENT OF GOAL(S)  [] 0-24%     [] 25-49%     [] 50-74%     [] %         DATE DSMES TOPIC EVALUATION     11/23/2021 HOW DO I FIND SUPPORT TO TACKLE THIS CONDITION?   a. Normal responses to diabetes diagnosis or complication    Shock    Anger & resentment    Guilt/self-blame    Sadness & worry    Depression     Anxiety    Pregnancy   b. Constructive strategies to normal responses     Exploring feelings & attitudes    Motivation: Cost versus benefits analysis    Problem-solving: Chain analysis    Obtaining support: Communicating   i. Family & friends   ii. Health team   iii. Community   c. Stress    Symptoms    Managing stress    Fill your tool kit        The participant can identify people that support them in caring for their diabetes health:  Yes, his father. The participant would like to pursue the following in keeping themselves healthy after completing the program:  He mentioned going back to [employer] and belonging to a [employer] community. He used to attend [employer] services regularly when he was younger. The participant would benefit from seeking professional emotional support for talk therapy and finding community support. DATE DSMES TOPIC EVALUATION     11/23/2021 HOW DO I FIGURE OUT WHAT'S INFLUENCING MY BLOOD GLUCOSES?   a. Problem solving using SOAR    Set goals    Sort options    Arrive at a plan    Reaffirm plan   b. Common problems in diabetes self-care    Hypoglycemia    Hyperglycemia    Sick days   c.  Pattern management d. Disaster preparedness plan        The participant has an action plan for    Hypoglycemia Yes   Hyperglycemia Yes   Sick days Yes   During disasters No, he plans to create a \"disaster kit\"    The participant would benefit from wearing medical alert and developing a disaster kit for emergency situations. Fátima Lovell RD on 11/23/2021 at 5:23 PM    I have personally reviewed the health record, including provider notes, laboratory data and current medications before making these care and education recommendations. The time spent in this effort is included in the total time.   Total minutes: 120

## 2022-02-09 LAB
BUN SERPL-MCNC: 8 MG/DL (ref 6–24)
BUN/CREAT SERPL: 12 (ref 9–20)
CALCIUM SERPL-MCNC: 8.6 MG/DL (ref 8.7–10.2)
CHLORIDE SERPL-SCNC: 105 MMOL/L (ref 96–106)
CO2 SERPL-SCNC: 24 MMOL/L (ref 20–29)
CREAT SERPL-MCNC: 0.65 MG/DL (ref 0.76–1.27)
EST. AVERAGE GLUCOSE BLD GHB EST-MCNC: 395 MG/DL
GLUCOSE SERPL-MCNC: 193 MG/DL (ref 65–99)
HBA1C MFR BLD: 15.4 % (ref 4.8–5.6)
POTASSIUM SERPL-SCNC: 3.8 MMOL/L (ref 3.5–5.2)
SODIUM SERPL-SCNC: 141 MMOL/L (ref 134–144)

## 2022-02-14 ENCOUNTER — OFFICE VISIT (OUTPATIENT)
Dept: ENDOCRINOLOGY | Age: 42
End: 2022-02-14
Payer: MEDICAID

## 2022-02-14 VITALS
DIASTOLIC BLOOD PRESSURE: 83 MMHG | TEMPERATURE: 98.1 F | OXYGEN SATURATION: 98 % | HEART RATE: 92 BPM | WEIGHT: 288.8 LBS | SYSTOLIC BLOOD PRESSURE: 125 MMHG | RESPIRATION RATE: 22 BRPM | BODY MASS INDEX: 43.77 KG/M2 | HEIGHT: 68 IN

## 2022-02-14 DIAGNOSIS — Z79.4 TYPE 2 DIABETES MELLITUS WITH HYPERGLYCEMIA, WITH LONG-TERM CURRENT USE OF INSULIN (HCC): Primary | ICD-10-CM

## 2022-02-14 DIAGNOSIS — I10 ESSENTIAL HYPERTENSION: ICD-10-CM

## 2022-02-14 DIAGNOSIS — Z91.119 NONADHERENCE WITH DIETARY RESTRICTION: ICD-10-CM

## 2022-02-14 DIAGNOSIS — E78.2 MIXED HYPERLIPIDEMIA: ICD-10-CM

## 2022-02-14 DIAGNOSIS — E11.65 TYPE 2 DIABETES MELLITUS WITH HYPERGLYCEMIA, WITH LONG-TERM CURRENT USE OF INSULIN (HCC): Primary | ICD-10-CM

## 2022-02-14 PROCEDURE — 99214 OFFICE O/P EST MOD 30 MIN: CPT | Performed by: INTERNAL MEDICINE

## 2022-02-14 RX ORDER — TIMOLOL MALEATE 5 MG/ML
SOLUTION/ DROPS OPHTHALMIC
COMMUNITY
Start: 2022-01-25

## 2022-02-14 RX ORDER — ATORVASTATIN CALCIUM 80 MG/1
80 TABLET, FILM COATED ORAL DAILY
COMMUNITY
Start: 2022-01-25

## 2022-02-14 RX ORDER — AMLODIPINE BESYLATE 5 MG/1
5 TABLET ORAL DAILY
COMMUNITY
Start: 2022-01-25

## 2022-02-14 NOTE — PATIENT INSTRUCTIONS
Check sugars before breakfast and  before dinner    Lantus 100 units at night , split it in 50 units and take at two different sites     Victoza 1.2 mg in AM     Continue metformin , Actos     Fast acting insulin Humalog or Novolog as needed based on the scale below     Blood sugar  Fast acting insulin        150-200  Extra 5 Units       201-250  Extra 10 Units       251-300  Extra 15 Units       301-350  Extra 20  Units        351-400  Extra 25 Units

## 2022-02-14 NOTE — PROGRESS NOTES
Steve Marvin is a 42 y.o. male here for   Chief Complaint   Patient presents with    Diabetes       1. Have you been to the ER, urgent care clinic since your last visit? Hospitalized since your last visit? -no    2. Have you seen or consulted any other health care providers outside of the [address] [place] [address] since your last visit? Include any pap smears or colon screening. -PCP

## 2022-02-14 NOTE — PROGRESS NOTES
[facility]               Violeta Avila MD      Patient Information  Date:2/14/2022  Name : Prakash Wolf 42 y.o.     YOB: 1980         Referred by: MD Marilu Benedict MD      Chief Complaint   Patient presents with    Diabetes     History of Present Illness: Prakash Wolf is a 42 y.o. male here for fu  of  Type 2 Diabetes Mellitus. Type 2 diabetes mellitus: On Lantus, Victoza, Metformin, Actos  Did not bring the meter, reportedly checking the blood glucose , fasting < 150 , no meter to confirm, his A1c is more than 15    He denies drinking sodas, very limited activity, he is working in [employer]  Reports to be taking Lantus  Tolerating Victoza  Breakfast - skips usually, very rarely eats cereal     Lunch - tuna sandwich,grilled chicken sandwich     Dinner- fish , stir chamorro ( no sauce) broccoli , denies eating pasta , bread    No exercise  No blurry vision,no pain the feet   History of nonadherence to diet      No fever, cough, shortness of breath      History from February 2019  When ever insulin is expensive he stops and then has hyperglycemia , goes to ER . I have discussed with him several times about getting Lantus for less than $10 with coupon and given coupon  Also discussed about [employer] brand insulin    He has severe insulin resistance. Wt Readings from Last 3 Encounters:   02/14/22 288 lb 12.8 oz (131 kg)   10/11/21 305 lb (138.3 kg)   07/08/21 299 lb (135.6 kg)       BP Readings from Last 3 Encounters:   02/14/22 125/83   10/11/21 (!) 135/91   07/08/21 133/85           Past Medical History:   Diagnosis Date    Bronchitis     Diabetes ([facility] [address]     HTN (hypertension)     Hyperlipidemia      Current Outpatient Medications   Medication Sig    amLODIPine (NORVASC) 5 mg tablet Take 5 mg by mouth daily.  atorvastatin (LIPITOR) 80 mg tablet Take 80 mg by mouth daily.     timolol (TIMOPTIC) 0.5 % ophthalmic solution INSTILL 1 DROP INTO RIGHT EYE TWICE A DAY    Insulin Needles, Disposable, (BD Ultra-Fine Mini Pen Needle) 31 gauge x 3/16\" ndle USE TO INJECT INSULIN AND VICTOZA DAILY**DX CODE: E11.65**    insulin glargine (Lantus Solostar U-100 Insulin) 100 unit/mL (3 mL) inpn INJECT 100 UNITS AT NIGHT    metFORMIN (GLUCOPHAGE) 1,000 mg tablet Take 1 Tablet by mouth two (2) times daily (with meals).  liraglutide (VICTOZA) 0.6 mg/0.1 mL (18 mg/3 mL) pnij Inject in AM 1.2 mg daily.  pioglitazone (ACTOS) 15 mg tablet Take 1 Tab by mouth daily.  glucose blood VI test strips (ONETOUCH ULTRA TEST) strip Test blood glucose three times daily Dx Code: E11.65    lancets (ONE TOUCH DELICA) 33 gauge misc Test blood glucose three times daily Dx Code: E11.65    Blood-Glucose Meter (ONETOUCH ULTRAMINI) monitoring kit Test blood glucose three times daily Dx Code: E11.65    metoprolol tartrate (LOPRESSOR) 50 mg tablet Take 1 Tab by mouth daily.  hydroCHLOROthiazide (HYDRODIURIL) 25 mg tablet TAKE 1 TABLET DAILY    cyclobenzaprine (FLEXERIL) 10 mg tablet TAKE 1 TABLET TWICE A DAY AS NEEDED    furosemide (LASIX) 20 mg tablet Take 1 tab every 3 days as neeed    simvastatin (ZOCOR) 20 mg tablet TAKE 1 TABLET BY MOUTH EVERY DAY AT NIGHT (Patient not taking: Reported on 2/14/2022)     No current facility-administered medications for this visit. Allergies   Allergen Reactions    Lisinopril Swelling         Review of Systems:  - Per HPI  -   -     Physical Examination:   Blood pressure 125/83, pulse 92, temperature 98.1 °F (36.7 °C), temperature source Temporal, resp. rate 22, height 5' 8\" (1.727 m), weight 288 lb 12.8 oz (131 kg), SpO2 98 %. Estimated body mass index is 43.91 kg/m² as calculated from the following:    Height as of this encounter: 5' 8\" (1.727 m). -   Weight as of this encounter: 288 lb 12.8 oz (131 kg).    - General: pleasant, no distress, good eye contact  - HEENT: no exopthalmos, no periorbital edema, no scleral/conjunctival injection, EOMI, no lid lag or stare  - Neck: supple, no thyromegaly,   - Cardiovascular: regular, normal rate, normal S1 and S2,  - Respiratory: clear to auscultation bilaterally, no respiratory distress  - Abdomen: No lipodystrophy  - Musculoskeletal: no proximal muscle weakness in upper or lower extremities  -   - Neurological:alert and oriented , no focal deficits  - Psychiatric: normal mood and affect   - Skin: Normal turgor, no rash    Diabetic feet exam ; July 2021    H/o partial or complete amputation of foot : No  H/o previous foot ulceration : No  H/o pre - ulcerative callus : No  H/o peripheral neuropathy and callus : No  H/o poor circulation  : No  Foot deformity : Yes, flatfeet, callus        Lab Results   Component Value Date/Time    Hemoglobin A1c 15.4 (H) 02/07/2022 09:08 AM    Hemoglobin A1c 12.9 (H) 10/04/2021 12:00 AM    Hemoglobin A1c 13.0 (H) 06/18/2021 12:00 AM    Glucose 193 (H) 02/07/2022 09:08 AM    Glucose  04/11/2019 09:02 AM    Microalbumin/Creat ratio (mg/g creat)  11/19/2019 10:52 AM     Cannot calculate ratio due to microalbumin result outside reportable range. Microalb/Creat ratio (ug/mg creat.) 11 10/04/2021 12:00 AM    Microalbumin,urine random <0.50 11/19/2019 10:52 AM    LDL,Direct 101 (H) 10/04/2021 12:00 AM    LDL, calculated 81 05/27/2016 10:24 AM    Creatinine 0.65 (L) 02/07/2022 09:08 AM    Hemoglobin A1c, External 7.0 02/08/2017 12:00 AM    Hemoglobin A1c, External 7.30 11/13/2014 04:29 AM      Lab Results   Component Value Date/Time    Cholesterol, total 141 05/27/2016 10:24 AM    HDL Cholesterol 43 05/27/2016 10:24 AM    LDL,Direct 101 (H) 10/04/2021 12:00 AM    LDL, calculated 81 05/27/2016 10:24 AM    Triglyceride 83 05/27/2016 10:24 AM     Lab Results   Component Value Date/Time    ALT (SGPT) 27 12/03/2020 11:33 AM    Alk.  phosphatase 104 12/03/2020 11:33 AM    Bilirubin, total 0.6 12/03/2020 11:33 AM     Lab Results   Component Value Date/Time    GFR est AA 139 02/07/2022 09:08 AM    GFR est non- 02/07/2022 09:08 AM    Creatinine 0.65 (L) 02/07/2022 09:08 AM    BUN 8 02/07/2022 09:08 AM    Sodium 141 02/07/2022 09:08 AM    Potassium 3.8 02/07/2022 09:08 AM    Chloride 105 02/07/2022 09:08 AM    CO2 24 02/07/2022 09:08 AM      Lab Results   Component Value Date/Time    TSH 1.050 07/09/2015 08:10 AM        Assessment/Plan:     No diagnosis found. 1. Type 2 Diabetes Mellitus  Lab Results   Component Value Date/Time    Hemoglobin A1c 15.4 (H) 02/07/2022 09:08 AM    Hemoglobin A1c (POC) >15 04/11/2019 09:03 AM    Hemoglobin A1c, External 7.0 02/08/2017 12:00 AM   poorly controlled DM , likely non adherent to diet as well as checking the blood glucose. Reported home blood glucose readings does not correlate with the A1c  Demonstrated insulin pen use in the office,  Lantus 100 units,  Counseled again, seen dietitian recently  victoza  Weight loss stressed  metformin  DKA x2, REGAN negative  Flavored water, avoid juices , cut down on snack portions      2. HTN : Continue current therapy , CPAP     Anaphylaxis with ACE     3. Hyperlipidemia : Continue statin. 4.Obesity:Body mass index is 43.91 kg/m². Discussed about the importance of exercise and carbohydrate portion control. TSH normal   Late-night DST normal       5 EMRE - CPAP -         There are no Patient Instructions on file for this visit. Thank you for allowing me to participate in the care of this patient.     Pili Durbin MD

## 2022-02-14 NOTE — LETTER
2/15/2022    Patient: Ryan Guy   YOB: 1980   Date of Visit: 2/14/2022     Kaludia Kerr MD  [place] [address]  Via Fax: [phone]    Dear Klaudia Kerr MD,      Thank you for referring Mr. Ryan Guy to [address] [place] [address] for evaluation. My notes for this consultation are attached. If you have questions, please do not hesitate to call me. I look forward to following your patient along with you.       Sincerely,    Gerardo Urbina MD

## 2022-03-18 PROBLEM — L03.90 CELLULITIS: Status: ACTIVE | Noted: 2020-04-16

## 2022-03-19 PROBLEM — E66.01 OBESITY, MORBID (HCC): Status: ACTIVE | Noted: 2019-04-11

## 2022-03-19 PROBLEM — N40.0 BPH WITHOUT URINARY OBSTRUCTION: Status: ACTIVE | Noted: 2021-04-21

## 2022-03-19 PROBLEM — Z79.4 TYPE 2 DIABETES MELLITUS WITH HYPERGLYCEMIA, WITH LONG-TERM CURRENT USE OF INSULIN (HCC): Status: ACTIVE | Noted: 2017-07-29

## 2022-03-19 PROBLEM — Z12.5 SCREENING PSA (PROSTATE SPECIFIC ANTIGEN): Status: ACTIVE | Noted: 2021-04-21

## 2022-03-19 PROBLEM — E11.65 TYPE 2 DIABETES MELLITUS WITH HYPERGLYCEMIA, WITH LONG-TERM CURRENT USE OF INSULIN (HCC): Status: ACTIVE | Noted: 2017-07-29

## 2022-03-20 PROBLEM — Z80.42 FAMILY HISTORY OF PROSTATE CANCER: Status: ACTIVE | Noted: 2021-04-21

## 2022-05-12 LAB
BUN SERPL-MCNC: 11 MG/DL (ref 6–24)
BUN/CREAT SERPL: 18 (ref 9–20)
CALCIUM SERPL-MCNC: 9.1 MG/DL (ref 8.7–10.2)
CHLORIDE SERPL-SCNC: 102 MMOL/L (ref 96–106)
CO2 SERPL-SCNC: 25 MMOL/L (ref 20–29)
CREAT SERPL-MCNC: 0.62 MG/DL (ref 0.76–1.27)
EGFR: 122 ML/MIN/1.73
EST. AVERAGE GLUCOSE BLD GHB EST-MCNC: 321 MG/DL
GLUCOSE SERPL-MCNC: 260 MG/DL (ref 65–99)
HBA1C MFR BLD: 12.8 % (ref 4.8–5.6)
POTASSIUM SERPL-SCNC: 4 MMOL/L (ref 3.5–5.2)
SODIUM SERPL-SCNC: 142 MMOL/L (ref 134–144)

## 2022-08-03 ENCOUNTER — OFFICE VISIT (OUTPATIENT)
Dept: ENDOCRINOLOGY | Age: 42
End: 2022-08-03
Payer: MEDICAID

## 2022-08-03 VITALS
BODY MASS INDEX: 43.95 KG/M2 | DIASTOLIC BLOOD PRESSURE: 84 MMHG | WEIGHT: 290 LBS | SYSTOLIC BLOOD PRESSURE: 128 MMHG | HEART RATE: 96 BPM | OXYGEN SATURATION: 96 % | TEMPERATURE: 97.6 F | HEIGHT: 68 IN | RESPIRATION RATE: 18 BRPM

## 2022-08-03 DIAGNOSIS — E78.2 MIXED HYPERLIPIDEMIA: ICD-10-CM

## 2022-08-03 DIAGNOSIS — I10 ESSENTIAL HYPERTENSION: ICD-10-CM

## 2022-08-03 DIAGNOSIS — E66.01 MORBID OBESITY (HCC): ICD-10-CM

## 2022-08-03 DIAGNOSIS — Z79.4 TYPE 2 DIABETES MELLITUS WITH HYPERGLYCEMIA, WITH LONG-TERM CURRENT USE OF INSULIN (HCC): Primary | ICD-10-CM

## 2022-08-03 DIAGNOSIS — Z91.119 NONADHERENCE WITH DIETARY RESTRICTION: ICD-10-CM

## 2022-08-03 DIAGNOSIS — E11.65 TYPE 2 DIABETES MELLITUS WITH HYPERGLYCEMIA, WITH LONG-TERM CURRENT USE OF INSULIN (HCC): Primary | ICD-10-CM

## 2022-08-03 PROCEDURE — 3046F HEMOGLOBIN A1C LEVEL >9.0%: CPT | Performed by: INTERNAL MEDICINE

## 2022-08-03 PROCEDURE — 99214 OFFICE O/P EST MOD 30 MIN: CPT | Performed by: INTERNAL MEDICINE

## 2022-08-03 RX ORDER — INSULIN LISPRO 100 [IU]/ML
INJECTION, SOLUTION INTRAVENOUS; SUBCUTANEOUS
Qty: 15 ML | Refills: 3 | Status: SHIPPED | OUTPATIENT
Start: 2022-08-03

## 2022-08-03 NOTE — LETTER
8/4/2022    Patient: Anne Castelan   YOB: 1980   Date of Visit: 8/3/2022     William Chun, [address] [place] [address] [address] [address]  Via Fax: [phone]    Dear William Chun MD,      Thank you for referring Mr. Anne Castelan to [address] [place] [address] for evaluation. My notes for this consultation are attached. If you have questions, please do not hesitate to call me. I look forward to following your patient along with you.       Sincerely,    Lionel Alvarado MD

## 2022-08-03 NOTE — PROGRESS NOTES
Esme Anderson is a 36 y.o. male here for   Chief Complaint   Patient presents with    Diabetes       1. Have you been to the ER, urgent care clinic since your last visit? Hospitalized since your last visit? -no    2. Have you seen or consulted any other health care providers outside of the [address] [place] [address] since your last visit?   Include any pap smears or colon screening.-no

## 2022-08-03 NOTE — PROGRESS NOTES
Gabriel Luz MD      Patient Information  Date:8/4/2022  Name : Tara Reddy 36 y.o.     YOB: 1980         Referred by: MD Alireza Lozada MD      Chief Complaint   Patient presents with    Diabetes       History of Present Illness: Tara Reddy is a 36 y.o. male here for fu  of  Type 2 Diabetes Mellitus. Type 2 diabetes mellitus: On Lantus, Victoza, Metformin, Actos  Blood glucose monitor: Did not bring it  Reportedly checking the blood glucose ,   Fasting 140 - 180 , no log   Weight has been fluctuating    He denies drinking sodas, very limited activity, he is working at [employer] - tuna sandwich,grilled chicken sandwich     Dinner- fish , stir chamorro ( no sauce) broccoli , denies eating pasta , bread    No exercise  No blurry vision,no pain the feet   History of nonadherence to diet      No fever, cough, shortness of breath      History from February 2019  When ever insulin is expensive he stops and then has hyperglycemia , goes to ER . I have discussed with him several times about getting Lantus for less than $10 with coupon and given coupon  Also discussed about [employer] brand insulin    He has severe insulin resistance. Wt Readings from Last 3 Encounters:   08/03/22 290 lb (131.5 kg)   02/14/22 288 lb 12.8 oz (131 kg)   10/11/21 305 lb (138.3 kg)       BP Readings from Last 3 Encounters:   08/03/22 128/84   02/14/22 125/83   10/11/21 (!) 135/91           Past Medical History:   Diagnosis Date    Bronchitis     Diabetes (HCC)     HTN (hypertension)     Hyperlipidemia      Current Outpatient Medications   Medication Sig    insulin lispro (HUMALOG) 100 unit/mL kwikpen 5 units if BG is 150 -200, 10 units 201-50,15 units 251-300 ,use before meals    amLODIPine (NORVASC) 5 mg tablet Take 5 mg by mouth daily. atorvastatin (LIPITOR) 80 mg tablet Take 80 mg by mouth daily.     timolol (TIMOPTIC) 0.5 % ophthalmic solution INSTILL 1 DROP INTO RIGHT EYE TWICE A DAY    Insulin Needles, Disposable, (BD Ultra-Fine Mini Pen Needle) 31 gauge x 3/16\" ndle USE TO INJECT INSULIN AND VICTOZA DAILY**DX CODE: E11.65**    insulin glargine (Lantus Solostar U-100 Insulin) 100 unit/mL (3 mL) inpn INJECT 100 UNITS AT NIGHT    metFORMIN (GLUCOPHAGE) 1,000 mg tablet Take 1 Tablet by mouth two (2) times daily (with meals). liraglutide (VICTOZA) 0.6 mg/0.1 mL (18 mg/3 mL) pnij Inject in AM 1.2 mg daily. pioglitazone (ACTOS) 15 mg tablet Take 1 Tab by mouth daily. glucose blood VI test strips (ONETOUCH ULTRA TEST) strip Test blood glucose three times daily Dx Code: E11.65    lancets (ONE TOUCH DELICA) 33 gauge misc Test blood glucose three times daily Dx Code: E11.65    Blood-Glucose Meter (ONETOUCH ULTRAMINI) monitoring kit Test blood glucose three times daily Dx Code: E11.65    metoprolol tartrate (LOPRESSOR) 50 mg tablet Take 1 Tab by mouth daily. hydroCHLOROthiazide (HYDRODIURIL) 25 mg tablet TAKE 1 TABLET DAILY    cyclobenzaprine (FLEXERIL) 10 mg tablet TAKE 1 TABLET TWICE A DAY AS NEEDED    furosemide (LASIX) 20 mg tablet Take 1 tab every 3 days as neeed    simvastatin (ZOCOR) 20 mg tablet TAKE 1 TABLET BY MOUTH EVERY DAY AT NIGHT (Patient not taking: Reported on 8/3/2022)     No current facility-administered medications for this visit. Allergies   Allergen Reactions    Lisinopril Swelling         Review of Systems:  Per HPI        Physical Examination:   Blood pressure 128/84, pulse 96, temperature 97.6 °F (36.4 °C), temperature source Temporal, resp. rate 18, height 5' 8\" (1.727 m), weight 290 lb (131.5 kg), SpO2 96 %. Estimated body mass index is 44.09 kg/m² as calculated from the following:    Height as of this encounter: 5' 8\" (1.727 m). Weight as of this encounter: 290 lb (131.5 kg).    General: pleasant, no distress, good eye contact  HEENT: no exopthalmos, no periorbital edema, no scleral/conjunctival injection, EOMI, no lid lag or stare  Neck: supple, no thyromegaly,   Cardiovascular: regular, normal rate, normal S1 and S2,  Respiratory: clear to auscultation bilaterally, no respiratory distress  Abdomen: No lipodystrophy  Musculoskeletal: no proximal muscle weakness in upper or lower extremities    Neurological:alert and oriented , no focal deficits  Psychiatric: normal mood and affect   Skin: Normal turgor, no rash    Diabetic feet exam ; July 2021    H/o partial or complete amputation of foot : No  H/o previous foot ulceration : No  H/o pre - ulcerative callus : No  H/o peripheral neuropathy and callus : No  H/o poor circulation  : No  Foot deformity : Yes, flatfeet, callus        Lab Results   Component Value Date/Time    Hemoglobin A1c 12.8 (H) 05/11/2022 12:00 AM    Hemoglobin A1c 15.4 (H) 02/07/2022 09:08 AM    Hemoglobin A1c 12.9 (H) 10/04/2021 12:00 AM    Glucose 260 (H) 05/11/2022 12:00 AM    Glucose  04/11/2019 09:02 AM    Microalbumin/Creat ratio (mg/g creat)  11/19/2019 10:52 AM     Cannot calculate ratio due to microalbumin result outside reportable range. Microalb/Creat ratio (ug/mg creat.) 11 10/04/2021 12:00 AM    Microalbumin,urine random <0.50 11/19/2019 10:52 AM    LDL,Direct 101 (H) 10/04/2021 12:00 AM    LDL, calculated 81 05/27/2016 10:24 AM    Creatinine 0.62 (L) 05/11/2022 12:00 AM    Hemoglobin A1c, External 7.0 02/08/2017 12:00 AM    Hemoglobin A1c, External 7.30 11/13/2014 04:29 AM      Lab Results   Component Value Date/Time    Cholesterol, total 141 05/27/2016 10:24 AM    HDL Cholesterol 43 05/27/2016 10:24 AM    LDL,Direct 101 (H) 10/04/2021 12:00 AM    LDL, calculated 81 05/27/2016 10:24 AM    Triglyceride 83 05/27/2016 10:24 AM     Lab Results   Component Value Date/Time    ALT (SGPT) 27 12/03/2020 11:33 AM    Alk.  phosphatase 104 12/03/2020 11:33 AM    Bilirubin, total 0.6 12/03/2020 11:33 AM     Lab Results   Component Value Date/Time    GFR est  02/07/2022 09:08 AM    GFR est non- 02/07/2022 09:08 AM    Creatinine 0.62 (L) 05/11/2022 12:00 AM    BUN 11 05/11/2022 12:00 AM    Sodium 142 05/11/2022 12:00 AM    Potassium 4.0 05/11/2022 12:00 AM    Chloride 102 05/11/2022 12:00 AM    CO2 25 05/11/2022 12:00 AM      Lab Results   Component Value Date/Time    TSH 1.050 07/09/2015 08:10 AM        Assessment/Plan:     1. Type 2 diabetes mellitus with hyperglycemia, with long-term current use of insulin ([address]    2. Essential hypertension    3. Mixed hyperlipidemia        1. Type 2 Diabetes Mellitus  Lab Results   Component Value Date/Time    Hemoglobin A1c 12.8 (H) 05/11/2022 12:00 AM    Hemoglobin A1c (POC) >15 04/11/2019 09:03 AM    Hemoglobin A1c, External 7.0 02/08/2017 12:00 AM   poorly controlled DM , likely non adherent to diet as well as checking the blood glucose. Lantus 100 units,  Counseled again, seen dietitian   victoza  Weight loss stressed  metformin  DKA x2, REGAN negative  Flavored water, avoid juices , cut down on snack portions      2. HTN : Continue current therapy , CPAP     Anaphylaxis with ACE     3. Hyperlipidemia : Continue statin. 4.Obesity:Body mass index is 44.09 kg/m². Discussed about the importance of exercise and carbohydrate portion control.   TSH normal   Low-dose dexamethasone suppression test normal      5 EMRE - CPAP -         Patient Instructions   Check sugars before breakfast and  before dinner    Lantus 100 units at night , split it in 50 units and take at two different sites     Victoza 1.2 mg in AM     Continue metformin , Actos     Fast acting insulin Humalog or Novolog as needed based on the scale below     Blood sugar  Fast acting insulin        150-200  Extra 5 Units       201-250  Extra 10 Units       251-300  Extra 15 Units       301-350  Extra 20  Units        351-400  Extra 25 Units     Follow-up and Dispositions    Return in about 5 months (around 1/3/2023) for labs before next visit and follow up.         Thank you for allowing me to participate in the care of this patient.     Shreya Clayton MD

## 2022-12-26 DIAGNOSIS — E11.65 TYPE 2 DIABETES MELLITUS WITH HYPERGLYCEMIA, WITH LONG-TERM CURRENT USE OF INSULIN (HCC): ICD-10-CM

## 2022-12-26 DIAGNOSIS — Z79.4 TYPE 2 DIABETES MELLITUS WITH HYPERGLYCEMIA, WITH LONG-TERM CURRENT USE OF INSULIN (HCC): ICD-10-CM

## 2022-12-27 RX ORDER — PEN NEEDLE, DIABETIC 31 GX3/16"
NEEDLE, DISPOSABLE MISCELLANEOUS
Qty: 100 PEN NEEDLE | Refills: 5 | Status: SHIPPED | OUTPATIENT
Start: 2022-12-27

## 2023-01-30 ENCOUNTER — OFFICE VISIT (OUTPATIENT)
Dept: ENDOCRINOLOGY | Age: 43
End: 2023-01-30

## 2023-01-30 VITALS
WEIGHT: 278.25 LBS | TEMPERATURE: 97.2 F | OXYGEN SATURATION: 96 % | SYSTOLIC BLOOD PRESSURE: 109 MMHG | DIASTOLIC BLOOD PRESSURE: 81 MMHG | BODY MASS INDEX: 42.17 KG/M2 | HEART RATE: 80 BPM | HEIGHT: 68 IN

## 2023-01-30 DIAGNOSIS — I10 ESSENTIAL HYPERTENSION: ICD-10-CM

## 2023-01-30 DIAGNOSIS — Z79.4 TYPE 2 DIABETES MELLITUS WITH HYPERGLYCEMIA, WITH LONG-TERM CURRENT USE OF INSULIN (HCC): Primary | ICD-10-CM

## 2023-01-30 DIAGNOSIS — E78.2 MIXED HYPERLIPIDEMIA: ICD-10-CM

## 2023-01-30 DIAGNOSIS — E66.01 MORBID OBESITY (HCC): ICD-10-CM

## 2023-01-30 DIAGNOSIS — E11.65 TYPE 2 DIABETES MELLITUS WITH HYPERGLYCEMIA, WITH LONG-TERM CURRENT USE OF INSULIN (HCC): Primary | ICD-10-CM

## 2023-01-30 PROCEDURE — 99215 OFFICE O/P EST HI 40 MIN: CPT | Performed by: INTERNAL MEDICINE

## 2023-01-30 PROCEDURE — 3074F SYST BP LT 130 MM HG: CPT | Performed by: INTERNAL MEDICINE

## 2023-01-30 PROCEDURE — 3079F DIAST BP 80-89 MM HG: CPT | Performed by: INTERNAL MEDICINE

## 2023-01-30 RX ORDER — INSULIN LISPRO 100 [IU]/ML
INJECTION, SOLUTION INTRAVENOUS; SUBCUTANEOUS
Qty: 30 ML | Refills: 11 | Status: SHIPPED | OUTPATIENT
Start: 2023-01-30

## 2023-01-30 RX ORDER — INSULIN GLARGINE 100 [IU]/ML
INJECTION, SOLUTION SUBCUTANEOUS
Qty: 30 ML | Refills: 3 | Status: SHIPPED | OUTPATIENT
Start: 2023-01-30

## 2023-01-30 NOTE — LETTER
1/30/2023    Patient: Elisabeth Bush   YOB: 1980   Date of Visit: 1/30/2023     Jacob Granados MD  [place] [address]  Via Fax: [phone]    Dear Jacob Granados MD,      Thank you for referring Mr. Elisabeth Bush to [address] [place] [address] for evaluation. My notes for this consultation are attached. If you have questions, please do not hesitate to call me. I look forward to following your patient along with you.       Sincerely,    Reyna Chavez MD

## 2023-01-30 NOTE — PROGRESS NOTES
Demetris Landers is a 36 y.o. male here for   Chief Complaint   Patient presents with    Diabetes       1. Have you been to the ER, urgent care clinic since your last visit? Hospitalized since your last visit? - No     2. Have you seen or consulted any other health care providers outside of the [address] [place] [address] since your last visit?   Include any pap smears or colon screening.- PCP

## 2023-01-30 NOTE — PATIENT INSTRUCTIONS
Check sugars before breakfast and before dinner    Lantus 100 units at night, split it in 50 units and take at two different sites     Victoza 1.2 mg in AM     Continue metformin, Actos     Humalog 20 units before breakfast and 20 units before dinner     Additional fast acting insulin Humalog or Novolog based on the scale below     Blood sugar  Fast acting insulin        150-200  Extra 5 Units       201-250  Extra 10 Units       251-300  Extra 15 Units       301-350  Extra 20  Units        351-400  Extra 25 Units

## 2023-01-30 NOTE — PROGRESS NOTES
Josselyn Anderson MD      Patient Information  Date:1/30/2023  Name : Gertha Gaucher 36 y.o.     YOB: 1980         Referred by: MD Shirley Hewitt MD      Chief Complaint   Patient presents with    Diabetes       History of Present Illness: Gertha Gaucher is a 36 y.o. male here for fu  of  Type 2 Diabetes Mellitus. 1/30/23  No meter, log, does not recall the readings  A1c is very high  Weight has been fluctuating  Lantus 80 units, instead of 100 units   Victoza, Metformin, Actos  Pt unable to identify starchy foods, candido  Has seen dietician in the past  Admitted to eating candy half a bag of lifesavers on a daily basis    Prior history    Type 2 diabetes mellitus: On Lantus, Victoza, Metformin, Actos  Blood glucose monitor: Did not bring it  Reportedly checking the blood glucose ,   Fasting 140 - 180 , no log   Weight has been fluctuating    He denies drinking sodas, very limited activity, he is working at [employer]  Tolerating Victoza    Lunch - tuna sandwich,grilled chicken sandwich     Dinner- fish , stir chamorro ( no sauce) broccoli , denies eating pasta , bread    No exercise  No blurry vision,no pain the feet   History of nonadherence to diet      No fever, cough, shortness of breath      History from February 2019  When ever insulin is expensive he stops and then has hyperglycemia , goes to ER . I have discussed with him several times about getting Lantus for less than $10 with coupon and given coupon  Also discussed about [employer] brand insulin    He has severe insulin resistance.          Wt Readings from Last 3 Encounters:   01/30/23 278 lb 4 oz (126.2 kg)   08/03/22 290 lb (131.5 kg)   02/14/22 288 lb 12.8 oz (131 kg)       BP Readings from Last 3 Encounters:   01/30/23 109/81   08/03/22 128/84   02/14/22 125/83           Past Medical History:   Diagnosis Date    Bronchitis     Diabetes ([address]     HTN (hypertension) Hyperlipidemia      Current Outpatient Medications   Medication Sig    insulin glargine (Lantus Solostar U-100 Insulin) 100 unit/mL (3 mL) inpn INJECT 100 UNITS AT NIGHT. E11.65    insulin lispro (HUMALOG) 100 unit/mL kwikpen Inject 20 units before breakfast and dinner with SSI. Max units daily:75    Insulin Needles, Disposable, (BD Ultra-Fine Mini Pen Needle) 31 gauge x 3/16\" ndle USE TO INJECT INSULIN AND VICTOZA DAILY    amLODIPine (NORVASC) 5 mg tablet Take 5 mg by mouth daily. atorvastatin (LIPITOR) 80 mg tablet Take 80 mg by mouth daily. timolol (TIMOPTIC) 0.5 % ophthalmic solution INSTILL 1 DROP INTO RIGHT EYE TWICE A DAY    metFORMIN (GLUCOPHAGE) 1,000 mg tablet Take 1 Tablet by mouth two (2) times daily (with meals). liraglutide (VICTOZA) 0.6 mg/0.1 mL (18 mg/3 mL) pnij Inject in AM 1.2 mg daily. pioglitazone (ACTOS) 15 mg tablet Take 1 Tab by mouth daily. glucose blood VI test strips (ONETOUCH ULTRA TEST) strip Test blood glucose three times daily Dx Code: E11.65    lancets (ONE TOUCH DELICA) 33 gauge misc Test blood glucose three times daily Dx Code: E11.65    Blood-Glucose Meter (ONETOUCH ULTRAMINI) monitoring kit Test blood glucose three times daily Dx Code: E11.65    metoprolol tartrate (LOPRESSOR) 50 mg tablet Take 1 Tab by mouth daily. hydroCHLOROthiazide (HYDRODIURIL) 25 mg tablet TAKE 1 TABLET DAILY    cyclobenzaprine (FLEXERIL) 10 mg tablet TAKE 1 TABLET TWICE A DAY AS NEEDED    furosemide (LASIX) 20 mg tablet Take 1 tab every 3 days as neeed    simvastatin (ZOCOR) 20 mg tablet TAKE 1 TABLET BY MOUTH EVERY DAY AT NIGHT (Patient not taking: No sig reported)     No current facility-administered medications for this visit.      Allergies   Allergen Reactions    Lisinopril Swelling         Review of Systems:  Per HPI        Physical Examination:   Blood pressure 109/81, pulse 80, temperature 97.2 °F (36.2 °C), temperature source Temporal, height 5' 8\" (1.727 m), weight 278 lb 4 oz (126.2 kg), SpO2 96 %. Estimated body mass index is 42.31 kg/m² as calculated from the following:    Height as of this encounter: 5' 8\" (1.727 m). Weight as of this encounter: 278 lb 4 oz (126.2 kg).    General: pleasant, no distress, good eye contact  HEENT: no exopthalmos, no periorbital edema, no scleral/conjunctival injection, EOMI, no lid lag or stare  Neck: supple, no thyromegaly,   Cardiovascular: regular, normal rate, normal S1 and S2,  Respiratory: clear to auscultation bilaterally, no respiratory distress  Abdomen: No lipodystrophy  Musculoskeletal: no proximal muscle weakness in upper or lower extremities    Neurological:alert and oriented , no focal deficits  Psychiatric: normal mood and affect   Skin: Normal turgor, no rash    Diabetic feet exam ; July 2021    H/o partial or complete amputation of foot : No  H/o previous foot ulceration : No  H/o pre - ulcerative callus : No  H/o peripheral neuropathy and callus : No  H/o poor circulation  : No  Foot deformity : Yes, flatfeet, callus        Lab Results   Component Value Date/Time    Hemoglobin A1c 12.5 (H) 12/28/2022 10:54 AM    Hemoglobin A1c 12.8 (H) 05/11/2022 12:00 AM    Hemoglobin A1c 15.4 (H) 02/07/2022 09:08 AM    Glucose 346 (H) 12/28/2022 10:54 AM    Glucose  04/11/2019 09:02 AM    Microalbumin/Creat ratio (mg/g creat) 20 12/28/2022 10:54 AM    Microalbumin,urine random 3.08 12/28/2022 10:54 AM    LDL,Direct 86 12/28/2022 10:54 AM    LDL, calculated 81 05/27/2016 10:24 AM    Creatinine 0.76 12/28/2022 10:54 AM    Hemoglobin A1c, External 12.3 06/17/2022 12:00 AM    Hemoglobin A1c, External 7.0 02/08/2017 12:00 AM    Hemoglobin A1c, External 7.30 11/13/2014 04:29 AM      Lab Results   Component Value Date/Time    Cholesterol, total 141 05/27/2016 10:24 AM    HDL Cholesterol 43 05/27/2016 10:24 AM    LDL,Direct 86 12/28/2022 10:54 AM    LDL, calculated 81 05/27/2016 10:24 AM    Triglyceride 83 05/27/2016 10:24 AM     Lab Results Component Value Date/Time    ALT (SGPT) 27 12/03/2020 11:33 AM    Alk. phosphatase 104 12/03/2020 11:33 AM    Bilirubin, total 0.6 12/03/2020 11:33 AM     Lab Results   Component Value Date/Time    GFR est  02/07/2022 09:08 AM    GFR est non- 02/07/2022 09:08 AM    Creatinine 0.76 12/28/2022 10:54 AM    BUN 12 12/28/2022 10:54 AM    Sodium 136 12/28/2022 10:54 AM    Potassium 3.6 12/28/2022 10:54 AM    Chloride 100 12/28/2022 10:54 AM    CO2 28 12/28/2022 10:54 AM      Lab Results   Component Value Date/Time    TSH 1.050 07/09/2015 08:10 AM        Assessment/Plan:     1. Type 2 diabetes mellitus with hyperglycemia, with long-term current use of insulin ([address]    2. Mixed hyperlipidemia    3. Essential hypertension    4. Morbid obesity ([address]          1. Type 2 Diabetes Mellitus  Lab Results   Component Value Date/Time    Hemoglobin A1c 12.5 (H) 12/28/2022 10:54 AM    Hemoglobin A1c (POC) >15 04/11/2019 09:03 AM    Hemoglobin A1c, External 12.3 06/17/2022 12:00 AM   poorly controlled DM ,  non adherent to diet as well as checking the blood glucose. Lantus 100 units,  Humalog 20 units before breakfast and before dinner  Counseled again, seen dietitian in the past, refer to nutritionist again, list of starches and protein given to the patient. Asked him to bring his father to next visit  victoza  Weight loss stressed  metformin  DKA x2, REGAN negative  Flavored water, avoid juices sodas including diet drinks, cut down on snack portions      2. HTN : Continue current therapy , CPAP     Anaphylaxis with ACE     3. Hyperlipidemia : Continue statin. 4.Obesity:Body mass index is 42.31 kg/m². Discussed about the importance of exercise and carbohydrate portion control.   TSH normal   Low-dose dexamethasone suppression test normal      5 EMRE - CPAP -     Spent > 40 minutes on the day of the visit reviewing chart, examining, ordering/reviewing labs, counseling, discussing therapeutics and documentation in the medical record    Patient Instructions   Check sugars before breakfast and before dinner    Lantus 100 units at night, split it in 50 units and take at two different sites     Victoza 1.2 mg in AM     Continue metformin, Actos     Humalog 20 units before breakfast and 20 units before dinner     Additional fast acting insulin Humalog or Novolog based on the scale below     Blood sugar  Fast acting insulin        150-200  Extra 5 Units       201-250  Extra 10 Units       251-300  Extra 15 Units       301-350  Extra 20  Units        351-400  Extra 25 Units     Follow-up and Dispositions    Return for follow up in 4 weeks and 4 months with labs before 4 month visit. Thank you for allowing me to participate in the care of this patient.     Grecia Sanchez MD

## 2023-02-15 ENCOUNTER — CLINICAL SUPPORT (OUTPATIENT)
Dept: DIABETES SERVICES | Age: 43
End: 2023-02-15
Payer: MEDICAID

## 2023-02-15 DIAGNOSIS — Z79.4 TYPE 2 DIABETES MELLITUS WITH HYPERGLYCEMIA, WITH LONG-TERM CURRENT USE OF INSULIN (HCC): ICD-10-CM

## 2023-02-15 DIAGNOSIS — E11.65 TYPE 2 DIABETES MELLITUS WITH HYPERGLYCEMIA, WITH LONG-TERM CURRENT USE OF INSULIN (HCC): ICD-10-CM

## 2023-02-15 PROCEDURE — G0108 DIAB MANAGE TRN  PER INDIV: HCPCS

## 2023-02-15 NOTE — PROGRESS NOTES
[address] [place] [address] for Diabetes Health  Diabetes Self-Management Education & Support Program    Reason for Referral: T2 hyperglycemia  Referral Source: Harry Graves MD  Services requested: DSMES       ASSESSMENT    From my perspective, the participant would benefit from [facility] specifically related to reducing risks, healthy eating, monitoring, taking medications, physical activity, healthy coping, and problem solving. Will adapt DSMES program to build on participant's skills score, confidence score, and preparedness score as noted in the Diabetes Skills, Confidence, and Preparedness Index. During the program, we will focus on providing DSMES that specifically addresses participant's interest in monitoring, as shown by their reported readiness to change. The participant would be best served by attending weekly group class series. Participant was in [address] [place] [address] prior. Needs reinforcement. Diabetes Self-Management Education Follow-up Visit: 3/9/23       Clinical Presentation  Dwight Jimenez is a 36 y.o.  male referred for diabetes self-management education. Participant has Type 2 DM on insulin for 21-30 years. Family history positivefor diabetes. Patient reports receiving DSMES services in the past. Most recent A1c value:   Lab Results   Component Value Date/Time    Hemoglobin A1c 12.5 (H) 12/28/2022 10:54 AM    Hemoglobin A1c (POC) >15 04/11/2019 09:03 AM    Hemoglobin A1c, External 12.3 06/17/2022 12:00 AM       Diabetes-related medications:  Current dosing:   Key Antihyperglycemic Medications               insulin glargine (Lantus Solostar U-100 Insulin) 100 unit/mL (3 mL) inpn INJECT 100 UNITS AT NIGHT. E11.65    insulin lispro (HUMALOG) 100 unit/mL kwikpen Inject 20 units before breakfast and dinner with SSI. Max units daily:75    metFORMIN (GLUCOPHAGE) 1,000 mg tablet Take 1 Tablet by mouth two (2) times daily (with meals).     liraglutide (VICTOZA) 0.6 mg/0.1 mL (18 mg/3 mL) pnij Inject in AM 1.2 mg daily. pioglitazone (ACTOS) 15 mg tablet Take 1 Tab by mouth daily. Blood Pressure Management  Key ACE/ARB Medications       Patient is on no ACE or ARB meds. Lipid Management  Key Antihyperlipidemia Meds               atorvastatin (LIPITOR) 80 mg tablet Take 80 mg by mouth daily. simvastatin (ZOCOR) 20 mg tablet TAKE 1 TABLET BY MOUTH EVERY DAY AT NIGHT            Clot Prevention  Key Anti-Platelet Anticoagulant Meds       The patient is on no antiplatelet meds or anticoagulants. Learning Assessment  Learning objectives Educator assessment (2/15/2023)   Diabetes Disease Process  The participant can   A) describe diabetes in basic terms;   B) state the type of diabetes they have; &   C) state accepted blood glucose targets. Healthy Eating  The participant can   A) identify carbohydrate foods; &   B) accurately read food labels. Being Active  The participant can  A) state the benefits of physical activity;  B) report their current PA practices;  C) identify PA they would consider incorporating in their lives; &  D) develop an implementation plan. Monitoring  The participant can  A) operate their blood glucose meter; &  B) describe how they log their blood glucoses to share with their provider. Taking Medications  The participant can  A) name their diabetes medications;  B) state the purpose and dose;  C) note side effects; &  D) describe proper storage, disposal & transport (if appropriate). Healthy Coping  The participant can    A) describe their response to diabetes diagnosis; B) describe their specific coping mechanisms;  C) identify supportive people and/or other resources that positively support their diabetes self-care and health. Reducing Risks  The participant can describe the preventive measures used by providers to promote health and prevent diabetes complications.      Problem Solving  The participant can   A) identify signs, symptoms & treatment of hypoglycemia;    B) identify signs, symptoms & treatment of hyperglycemia;  C) describe their sick day plan; &  D) identify BG patterns to discuss with their provider. Yes  Yes  Yes        No  No        No  Yes  Yes  Yes        Yes  Yes        No  No  Yes  No        No  No  Yes        No          No  Yes  No  No     Characteristics to Learning   Barriers to Learning      None     Favorite Ways to Learn   [x] Lecture  [] Slides  [] Reading [] Video-Internet  [] Cassettes/CDs/MP3's  [x] Interactive Small Groups [] Other       Behavioral Assessment  Current self-care practices  Educator assessment (2/15/2023)   Healthy Eating   Current practices    24-hour Dietary Recall:  Breakfast: skipped  Lunch: nuggets,Hi-C orange  Dinner: turkey w/ gravy, water  Snacks: nuts, life savers  Beverages: water  Alcohol: 2 drinks/month       Would benefit from DSMES related to Healthy Eating: Yes    Eats a carbohydrate controlled diet: No    Stage of change: Contemplation      Being Active  Current practices  How many days during the past week have you performed physical activity where your heart beats faster and your breathing is harder than normal for 30 minutes or more? 3 day(s)    How many days in a typical week do you perform activity such as this?  3 day(s)     Would benefit from [facility] related to Being Active: Yes}      Exercises 150 minutes/week: Yes      Stage of change: Maintenance      Monitoring  Current practices  Do you monitor your blood sugar? Yes    How often do you monitor? 1x/day    What are the range of readings? 117-500 mg/dL    Do you know your last A1c measurement? Yes    Do you know the meaning of the A1c? No     Would benefit from [facility] related to Monitoring: Yes      Uses BG readings to establish trends and understand BG patterns: No      Stage of change: Preparation       Taking Medication  Current practices  Do you understand what your diabetes medications do?  Yes    How often do you miss doses of your diabetes medications? never    Can you afford your diabetes medications? Yes   Would benefit from [facility] related to Taking Medication: No      Takes medications consistently to receive full benefit: Yes      Stage of change: Maintenance        Healthy Coping   Current state  Diabetes Skills, Confidence and Preparedness Index: Total score: 3.8  Skills: 3.3  Confidence: 3.1  Preparedness: 5.1       Would benefit from DSMES related to Healthy Coping: Yes      Identifies specific people, organizations,etc, that actively support their diabetes self-care efforts: Yes      Stage of change: Preparation     Reducing Risks  Current state  Vaccines:  Influenza:   Immunization History   Administered Date(s) Administered    Influenza, FLUARIX, FLULAVAL, FLUZONE (age 10 mo+) AND AFLURIA, (age 1 y+), PF, 0.5mL 09/27/2018, 09/04/2019       Pneumococcal: There is no immunization history for the selected administration types on file for this patient. Hepatitis: There is no immunization history for the selected administration types on file for this patient. Examinations:  Diabetic Foot and Eye Exam HM Status   Topic Date Due    Eye Exam  01/10/2022    Diabetic Foot Care  07/08/2022        Dental exam: last appointment was: January 2023    Foot exam: done on: 2/14/23    Heart Protection:  BP Readings from Last 2 Encounters:   01/30/23 109/81   08/03/22 128/84        Lab Results   Component Value Date/Time    LDL,Direct 86 12/28/2022 10:54 AM    LDL, calculated 81 05/27/2016 10:24 AM        Kidney Protection:  Lab Results   Component Value Date/Time    Microalbumin/Creat ratio (mg/g creat) 20 12/28/2022 10:54 AM    Microalbumin,urine random 3.08 12/28/2022 10:54 AM        Would benefit from [facility] related to Reducing Risks:  Yes      Actively participates in decision-making with provider regarding secondary prevention:  No      Stage of change: Preparation   Problem Solving  Current state  Hypoglycemia Management:  What are signs and symptoms of hypoglycemia that you experience: Pt reported being unaware of s/s of hypoglycemia    How do you prevent hypoglycemia: patient is unaware of how to treat low blood sugars    How do you treat hypoglycemia: Rule of 15    Hyperglycemia Management:  What are signs and symptoms of hyperglycemia that you experience: Extreme thirst, Frequent urination, Dry skin    How can you prevent hyperglycemia: patient is unaware of how to prevent high blood sugars    Sick Day Management:  What do you do differently on sick days:  Pt reported being unaware of self-management on sick days    Pattern Management:  Do you notice blood glucose patterns when you look at the readings in your meter or logbook? No    How do you use the blood glucose readings from your meter or logbook? Unable to establish pattern     Would benefit from [facility] related to Problem Solving: No      Articulates appropriate strategies to address hypoglycemia, hyperglycemia, sick day care and BG pattern: No      Stage of change: Preparation       Note: Content derived from the American Association of Diabetes Educators' Diabetes Education Curriculum: A Guide to Successful Self-Management (3rd edition)      Siddhartha Hi RD on 2/15/2023 at 1:01 PM    I have personally reviewed the health record, including provider notes, laboratory data and current medications before making these care and education recommendations. The time spent in this effort is included in the total time. Total minutes: 30    [facility] Logo  Diabetes Skills, Confidence & Preparedness Index (SCPI) ©  Thank you for completing the Skills, Confidence & Preparedness Index! Below are your scores. All scales and questions are out of 7. If you would like these results emailed, please enter your email address along with some identifying patient information.   Email:    Patient Identifier:        Overall SCPI score: 3.8 Skills Score: 3.3  Low: Healthy Eating(Q1),Reducing Risks(Q5),Problem Solving(Q6),Healthy Coping(Q7),Blood Sugar Monitoring(Q8),Reducing Risks(Q9) Confidence Score: 3.1  Low: Healthy Eating(Q1),Healthy Coping(Q2),Reducing Risks(Q3),Healthy NEAGAF(D8) Preparedness Score: 5.1  Low: Healthy Coping(Q3),Reducing Risks(Q4),Problem RVEJUUL(F8)  Healthy Eating Score: 3.0  Low: Skills(Q1),Confidence(Q1),Confidence(Q4) Taking Medication Score: 6.0  Low: Skills(Q2),Preparedness(Q5) Blood Sugar Monitoring Score: 4.8  Low: Skills(Q8) Reducing Risks Score: 2.5  Low: Skills(Q5),Skills(Q9),Confidence(Q3)  Problem Solving Score: 3.3  Low: Skills(Q6) Healthy Coping Score: 2.7  Low: Skills(Q7),Confidence(Q2) Being Active Score: 6.0  Low: Confidence(Q5),Preparedness(Q2)    Skills/Knowledge Questions  1. I know how to plan meals that have the best balance between carbohydrates, proteins and vegetables. 2  2. I know how my diabetes medications (pills, injectables and/or insulin) work in my body. 6  3. I know when to check my blood sugar if I want to see how my body responded to a meal. 6  4. I know when to check my blood sugars to determine if my medication or insulin doses are correct. 6  5. I know what to do to prevent a low blood sugar when I exercise (either before, during, or after). 2  6. When I am sick, I know what to do differently with my diabetes management. 2  7. I know how stress can affect my diabetes management. 2  8. When I look at my blood sugars over a given week, I can explain what my blood sugar pattern is. 2  9. I know what my target levels are for A1c, blood pressure and cholesterol. 2  Confidence Questions  1. I am confident that I can plan balanced meals and snacks. 2  2. I am confident that I can manage my stress. 2  3. I am confident that I can prevent a low blood sugar during or after exercise. 2  4. I am confident that the next time I eat out, I will be able to choose foods that best keep my blood sugars in target. 2  5.  I am confident I can include exercise into my schedule. 6  6. I am confident that I can use my daily blood sugars to adjust my diet, my activity, and/or my insulin. 4  7. When something out of my normal routine happens, I am confident that I can problem-solve and keep my diabetes on track. 4  Preparedness Questions  1. Within the next month, I will begin to eat more balanced meals and snacks. 6  2. Within the next month, I will choose an exercise activity and I will start fitting it into my schedule. 6  3. Within the next month, I will make a list of stress management options that work for me. 4  4. Within the next month, I will consistently plan ahead to prevent low blood sugars. 4  5. Within the next month, I will start adjusting my insulin doses on my own. 6  6. Within the next month, I will begin making changes to my diabetes management based on my daily blood sugars (eg - eating, activity and/or insulin). 6  7. Within the next month, I will begin making changes to my diabetes management to meet my overall goals (eg - eating, activity and/or insulin).  4

## 2023-02-16 ENCOUNTER — TELEPHONE (OUTPATIENT)
Dept: ENDOCRINOLOGY | Age: 43
End: 2023-02-16

## 2023-02-16 DIAGNOSIS — Z79.4 TYPE 2 DIABETES MELLITUS WITH HYPERGLYCEMIA, WITH LONG-TERM CURRENT USE OF INSULIN (HCC): ICD-10-CM

## 2023-02-16 DIAGNOSIS — E11.65 TYPE 2 DIABETES MELLITUS WITH HYPERGLYCEMIA, WITH LONG-TERM CURRENT USE OF INSULIN (HCC): ICD-10-CM

## 2023-02-16 RX ORDER — BLOOD SUGAR DIAGNOSTIC
STRIP MISCELLANEOUS
Qty: 300 STRIP | Refills: 3 | Status: SHIPPED | OUTPATIENT
Start: 2023-02-16

## 2023-02-28 ENCOUNTER — OFFICE VISIT (OUTPATIENT)
Dept: ENDOCRINOLOGY | Age: 43
End: 2023-02-28
Payer: MEDICAID

## 2023-02-28 VITALS
RESPIRATION RATE: 18 BRPM | OXYGEN SATURATION: 94 % | HEIGHT: 68 IN | BODY MASS INDEX: 43.19 KG/M2 | DIASTOLIC BLOOD PRESSURE: 84 MMHG | WEIGHT: 285 LBS | HEART RATE: 91 BPM | TEMPERATURE: 97.7 F | SYSTOLIC BLOOD PRESSURE: 125 MMHG

## 2023-02-28 DIAGNOSIS — Z91.119 NONADHERENCE WITH DIETARY RESTRICTION: ICD-10-CM

## 2023-02-28 DIAGNOSIS — E11.65 TYPE 2 DIABETES MELLITUS WITH HYPERGLYCEMIA, WITH LONG-TERM CURRENT USE OF INSULIN (HCC): Primary | ICD-10-CM

## 2023-02-28 DIAGNOSIS — E78.2 MIXED HYPERLIPIDEMIA: ICD-10-CM

## 2023-02-28 DIAGNOSIS — Z79.4 TYPE 2 DIABETES MELLITUS WITH HYPERGLYCEMIA, WITH LONG-TERM CURRENT USE OF INSULIN (HCC): Primary | ICD-10-CM

## 2023-02-28 DIAGNOSIS — I10 ESSENTIAL HYPERTENSION: ICD-10-CM

## 2023-02-28 PROCEDURE — 3074F SYST BP LT 130 MM HG: CPT | Performed by: INTERNAL MEDICINE

## 2023-02-28 PROCEDURE — 99214 OFFICE O/P EST MOD 30 MIN: CPT | Performed by: INTERNAL MEDICINE

## 2023-02-28 PROCEDURE — 3078F DIAST BP <80 MM HG: CPT | Performed by: INTERNAL MEDICINE

## 2023-02-28 RX ORDER — INSULIN LISPRO 100 [IU]/ML
INJECTION, SOLUTION INTRAVENOUS; SUBCUTANEOUS
Qty: 30 ML | Refills: 11
Start: 2023-02-28

## 2023-02-28 NOTE — LETTER
3/1/2023    Patient: Jer Esteban   YOB: 1980   Date of Visit: 2/28/2023     Amy Prather MD  [place] [id number]  Via Fax: [phone]    Dear Amy Prather MD,      Thank you for referring Mr. Jer Esteban to [address] [place] [address] for evaluation. My notes for this consultation are attached. If you have questions, please do not hesitate to call me. I look forward to following your patient along with you.       Sincerely,    Javier Daniels MD

## 2023-02-28 NOTE — PATIENT INSTRUCTIONS
Check sugars before breakfast and before dinner    Lantus 100 units at night, split it in 50 units and take at two different sites     Victoza 1.2 mg in AM     Continue metformin, Actos     Humalog 25 units before breakfast and 25 units before dinner     Additional fast acting insulin Humalog or Novolog based on the scale below     Blood sugar  Fast acting insulin        150-200  Extra 5 Units       201-250  Extra 10 Units       251-300  Extra 15 Units       301-350  Extra 20  Units        351-400  Extra 25 Units

## 2023-02-28 NOTE — PROGRESS NOTES
Jose Engel MD      Patient Information  Date:3/1/2023  Name : Johnny Dc 36 y.o.     YOB: 1980         Referred by: MD Terence Daigle MD      Chief Complaint   Patient presents with    Diabetes       History of Present Illness: Johnny Dc is a 36 y.o. male here for fu  of  Type 2 Diabetes Mellitus. 2/28/23  Checking BG once a day  Has seen dietician - has another appt 3/9/23  Taking insulin as prescribed per patient  Did not bring the meter, just has the logbook with the blood glucose has been checked once daily, ranging from 120-190      1/30/23  No meter, log, does not recall the readings  A1c is very high  Weight has been fluctuating  Lantus 80 units, instead of 100 units   Victoza, Metformin, Actos  Pt unable to identify starchy foods, candido  Has seen dietician in the past  Admitted to eating candy half a bag of lifesavers on a daily basis    Prior history    Type 2 diabetes mellitus: On Lantus, Victoza, Metformin, Actos  Blood glucose monitor: Did not bring it  Reportedly checking the blood glucose ,   Fasting 140 - 180 , no log   Weight has been fluctuating    He denies drinking sodas, very limited activity, he is working at [employer]  Tolerating Victoza    Lunch - tuna sandwich,grilled chicken sandwich     Dinner- fish , stir chamorro ( no sauce) brocc/cathy , denies eating pasta , bread    No exercise  No blurry vision,no pain the feet   History of nonadherence to diet      No fever, cough, shortness of breath      History from February 2019  When ever insulin is expensive he stops and then has hyperglycemia , goes to ER . I have discussed with him several times about getting Lantus for less than $10 with coupon and given coupon  Also discussed about [employer] brand insulin    He has severe insulin resistance.          Wt Readings from Last 3 Encounters:   02/28/23 285 lb (129.3 kg)   01/30/23 278 lb 4 oz (126.2 kg) 08/03/22 290 lb (131.5 kg)       BP Readings from Last 3 Encounters:   02/28/23 125/84   01/30/23 109/81   08/03/22 128/84       Past Medical History:   Diagnosis Date    Bronchitis     Diabetes ([facility] [address]     HTN (hypertension)     Hyperlipidemia      Current Outpatient Medications   Medication Sig    insulin lispro (HUMALOG) 100 unit/mL kwikpen Inject 25 units before breakfast and dinner with SSI. Max units daily:75    OneTouch Ultra Test strip Test blood glucose three times daily Dx Code: E11.65    insulin glargine (Lantus Solostar U-100 Insulin) 100 unit/mL (3 mL) inpn INJECT 100 UNITS AT NIGHT. E11.65    Insulin Needles, Disposable, (BD Ultra-Fine Mini Pen Needle) 31 gauge x 3/16\" ndle USE TO INJECT INSULIN AND VICTOZA DAILY    amLODIPine (NORVASC) 5 mg tablet Take 5 mg by mouth daily. atorvastatin (LIPITOR) 80 mg tablet Take 80 mg by mouth daily. timolol (TIMOPTIC) 0.5 % ophthalmic solution INSTILL 1 DROP INTO RIGHT EYE TWICE A DAY    metFORMIN (GLUCOPHAGE) 1,000 mg tablet Take 1 Tablet by mouth two (2) times daily (with meals). liraglutide (VICTOZA) 0.6 mg/0.1 mL (18 mg/3 mL) pnij Inject in AM 1.2 mg daily. pioglitazone (ACTOS) 15 mg tablet Take 1 Tab by mouth daily. lancets (ONE TOUCH DELICA) 33 gauge misc Test blood glucose three times daily Dx Code: E11.65    Blood-Glucose Meter (ONETOUCH ULTRAMINI) monitoring kit Test blood glucose three times daily Dx Code: E11.65    metoprolol tartrate (LOPRESSOR) 50 mg tablet Take 1 Tab by mouth daily. hydroCHLOROthiazide (HYDRODIURIL) 25 mg tablet TAKE 1 TABLET DAILY    cyclobenzaprine (FLEXERIL) 10 mg tablet TAKE 1 TABLET TWICE A DAY AS NEEDED    furosemide (LASIX) 20 mg tablet Take 1 tab every 3 days as neeed    simvastatin (ZOCOR) 20 mg tablet TAKE 1 TABLET BY MOUTH EVERY DAY AT NIGHT (Patient not taking: No sig reported)     No current facility-administered medications for this visit.      Allergies   Allergen Reactions    Lisinopril Swelling Review of Systems:  Per HPI        Physical Examination:   Blood pressure 125/84, pulse 91, temperature 97.7 °F (36.5 °C), temperature source Temporal, resp. rate 18, height 5' 8\" (1.727 m), weight 285 lb (129.3 kg), SpO2 94 %. Estimated body mass index is 43.33 kg/m² as calculated from the following:    Height as of this encounter: 5' 8\" (1.727 m). Weight as of this encounter: 285 lb (129.3 kg).    General: pleasant, no distress, good eye contact  HEENT: no exopthalmos, no periorbital edema, no scleral/conjunctival injection, EOMI, no lid lag or stare  Neck: supple, no thyromegaly,   Cardiovascular: regular, normal rate, normal S1 and S2,  Respiratory: clear to auscultation bilaterally, no respiratory distress  Abdomen: No lipodystrophy  Musculoskeletal: no proximal muscle weakness in upper or lower extremities    Neurological:alert and oriented , no focal deficits  Psychiatric: normal mood and affect   Skin: Normal turgor, no rash    Diabetic feet exam ; July 2021    H/o partial or complete amputation of foot : No  H/o previous foot ulceration : No  H/o pre - ulcerative callus : No  H/o peripheral neuropathy and callus : No  H/o poor circulation  : No  Foot deformity : Yes, flatfeet, callus        Lab Results   Component Value Date/Time    Hemoglobin A1c 12.5 (H) 12/28/2022 10:54 AM    Hemoglobin A1c 12.8 (H) 05/11/2022 12:00 AM    Hemoglobin A1c 15.4 (H) 02/07/2022 09:08 AM    Glucose 346 (H) 12/28/2022 10:54 AM    Glucose  04/11/2019 09:02 AM    Microalbumin/Creat ratio (mg/g creat) 20 12/28/2022 10:54 AM    Microalbumin,urine random 3.08 12/28/2022 10:54 AM    LDL,Direct 86 12/28/2022 10:54 AM    LDL, calculated 81 05/27/2016 10:24 AM    Creatinine 0.76 12/28/2022 10:54 AM    Hemoglobin A1c, External 12.3 06/17/2022 12:00 AM    Hemoglobin A1c, External 7.0 02/08/2017 12:00 AM    Hemoglobin A1c, External 7.30 11/13/2014 04:29 AM      Lab Results   Component Value Date/Time    Cholesterol, total 141 05/27/2016 10:24 AM    HDL Cholesterol 43 05/27/2016 10:24 AM    LDL,Direct 86 12/28/2022 10:54 AM    LDL, calculated 81 05/27/2016 10:24 AM    Triglyceride 83 05/27/2016 10:24 AM     Lab Results   Component Value Date/Time    ALT (SGPT) 27 12/03/2020 11:33 AM    Alk. phosphatase 104 12/03/2020 11:33 AM    Bilirubin, total 0.6 12/03/2020 11:33 AM     Lab Results   Component Value Date/Time    GFR est  02/07/2022 09:08 AM    GFR est non- 02/07/2022 09:08 AM    Creatinine 0.76 12/28/2022 10:54 AM    BUN 12 12/28/2022 10:54 AM    Sodium 136 12/28/2022 10:54 AM    Potassium 3.6 12/28/2022 10:54 AM    Chloride 100 12/28/2022 10:54 AM    CO2 28 12/28/2022 10:54 AM      Lab Results   Component Value Date/Time    TSH 1.050 07/09/2015 08:10 AM        Assessment/Plan:     1. Type 2 diabetes mellitus with hyperglycemia, with long-term current use of insulin ([address]    2. Mixed hyperlipidemia    3. Essential hypertension            1. Type 2 Diabetes Mellitus  Lab Results   Component Value Date/Time    Hemoglobin A1c 12.5 (H) 12/28/2022 10:54 AM    Hemoglobin A1c (POC) >15 04/11/2019 09:03 AM    Hemoglobin A1c, External 12.3 06/17/2022 12:00 AM   poorly controlled DM ,  non adherent to diet as well as checking the blood glucose. Lantus 100 units,  Humalog 25 units before breakfast and before dinner  Counseled again, seen dietitian in the past, refer to nutritionist again, list of starches and protein given to the patient. Asked him to bring his father to next visit which he did not,  victoza  Weight loss stressed  metformin  DKA x2, REGAN negative  Flavored water, avoid juices sodas including diet drinks, cut down on snack portions      2. HTN : Continue current therapy , CPAP     Anaphylaxis with ACE     3. Hyperlipidemia : Continue statin. 4.Obesity:Body mass index is 43.33 kg/m². Discussed about the importance of exercise and carbohydrate portion control.   TSH normal   Low-dose dexamethasone suppression test normal      5 EMRE - CPAP -       Patient Instructions   Check sugars before breakfast and before dinner    Lantus 100 units at night, split it in 50 units and take at two different sites     Victoza 1.2 mg in AM     Continue metformin, Actos     Humalog 25 units before breakfast and 25 units before dinner     Additional fast acting insulin Humalog or Novolog based on the scale below     Blood sugar  Fast acting insulin        150-200  Extra 5 Units       201-250  Extra 10 Units       251-300  Extra 15 Units       301-350  Extra 20  Units        351-400  Extra 25 Units             Thank you for allowing me to participate in the care of this patient.     Khadar Mata MD

## 2023-02-28 NOTE — PROGRESS NOTES
Stephanie Munson is a 36 y.o. male here for   Chief Complaint   Patient presents with    Diabetes       1. Have you been to the ER or an urgent care clinic since your last visit?  - no    2. Have you been hospitalized since your last visit? - no    3. Have you seen or consulted any other health care providers outside of the [address] [place] [address] since your last visit?   Include any pap smears or colon screening.- PCP

## 2023-03-09 ENCOUNTER — CLINICAL SUPPORT (OUTPATIENT)
Dept: DIABETES SERVICES | Age: 43
End: 2023-03-09

## 2023-03-09 DIAGNOSIS — E11.65 TYPE 2 DIABETES MELLITUS WITH HYPERGLYCEMIA, WITH LONG-TERM CURRENT USE OF INSULIN (HCC): Primary | ICD-10-CM

## 2023-03-09 DIAGNOSIS — Z79.4 TYPE 2 DIABETES MELLITUS WITH HYPERGLYCEMIA, WITH LONG-TERM CURRENT USE OF INSULIN (HCC): Primary | ICD-10-CM

## 2023-03-13 NOTE — PROGRESS NOTES
[employer] [facility] for Diabetes [facility]  Diabetes Self-Management Education & Support Program  Encounter Note    SUMMARY  Diabetes self-care management training was completed related to reducing risks. The participant will return on March 16 to continue DSMES related to healthy eating and monitoring. The participant did not identify SMART Goal(s) and will practice knowledge and skills related to reducing risks and healthy eating and monitoring to improve diabetes self-management. EVALUATION:  Participant states that he had Type 2 Diabetes Mellitus for many years. He is checking his blood sugars and sharing them with his providers. He states that he has a history of sleep apnea. He is up to date on his dental, foot and eye exams. RECOMMENDATIONS:  Encouraged him to engage in some regular physical activities weekly, bring blood sugar log to next class for review and talk to his provider whenever needed about his diabetes management. TOPICS DISCUSSED TODAY:  WHAT IS DIABETES? Minutes: Fidel Spann 58? 60    Next provider visit is scheduled for 5/30/23 with Dr. Johnson Person EVALUATION     3/13/2023 WHAT IS DIABETES? Role of the normal pancreas in energy balance and blood glucose control   The defect seen in diabetes   Signs & symptoms of diabetes   Diagnosis of diabetes   Types of diabetes   Blood glucose targets in non-pregnant & non-pregnant adults       The participant knows  Their type of diabetes: Yes  The basic physiologic defect: Yes  Blood glucose targets: Yes     DATE DSMES TOPIC EVALUATION     3/13/2023 HOW DO I STAY HEALTHY?    Prevention   Vaccinations   Preconception care (if applicable)  Examinations   Eye    Foot   Diabetic complications' prevention   Dental health   Heart health   Kidney Health   Nerve health   Sleep health      The participant has a personal diabetes care record to keep abreast of diabetes health No     The participant needs to address starting to keep a personal care record of his diabetes management. Michael Rothman RN on 3/9/2023 at 3:55 PM    I have personally reviewed the health record, including provider notes, laboratory data and current medications before making these care and education recommendations. The time spent in this effort is included in the total time.   Total minutes: 120

## 2023-03-16 ENCOUNTER — CLINICAL SUPPORT (OUTPATIENT)
Dept: DIABETES SERVICES | Age: 43
End: 2023-03-16
Payer: MEDICAID

## 2023-03-16 DIAGNOSIS — E11.65 TYPE 2 DIABETES MELLITUS WITH HYPERGLYCEMIA, WITH LONG-TERM CURRENT USE OF INSULIN (HCC): Primary | ICD-10-CM

## 2023-03-16 DIAGNOSIS — Z79.4 TYPE 2 DIABETES MELLITUS WITH HYPERGLYCEMIA, WITH LONG-TERM CURRENT USE OF INSULIN (HCC): Primary | ICD-10-CM

## 2023-03-16 PROCEDURE — G0109 DIAB MANAGE TRN IND/GROUP: HCPCS

## 2023-03-17 NOTE — PROGRESS NOTES
[facility]  Diabetes Self-Management Education & Support Program  Encounter Note    SUMMARY  Diabetes self-care management training was completed related to healthy eating and monitoring. The participant will return on March 23 to continue DSMES related to taking medications and physical activity. The participant did identify SMART Goal(s) and will practice knowledge and skills related to healthy eating and being active to improve diabetes self-management. EVALUATION:  Participant is checking his blood sugars daily. He says that his numbers have improved some. He is wanting to engage in more physical fitness during the week. He will start to eat more healthy meals that includes non-starchy vegetables for lunch and dinner meals. He is taking his diabetes medications as prescribed without complications. RECOMMENDATIONS:  Encouraged him to continue to monitor his blood sugars daily, log his readings and share them with his providers. Engage in some regular physical activities weekly. TOPICS DISCUSSED TODAY:  WHAT CAN I EAT? 61  HOW CAN BLOOD GLUCOSE MONITORING HELP ME? 60    Next provider visit is scheduled for 5/30/23 with Dr. Qasim Magaña     SMART GOAL(S)  Increase physical activity by going to [employer] for 60 minutes 3 times over the next week. (Goal set on 3/16/23)  ACHIEVEMENT OF GOAL(S) : 0-24%     DATE DSMES TOPIC EVALUATION     3/17/2023 WHAT CAN I EAT? General principles   Determining a healthy weight   Nutritional terms & tools   Healthy Plate method   Carbohydrate Counting   Reading food labels   Free apps   Pregnancy recommendations      The participant   Uses Healthy Plate principles in constructing meals: No  Reads food labels in choosing acceptable foods: Yes    The participant needs to address eating more healthy meals and monitoring carbohydrate amounts. No snacking on carbohydrate foods.      DATE DSMES TOPIC EVALUATION     3/17/2023 HOW CAN BLOOD GLUCOSE MONITORING HELP ME? Value of blood glucose monitoring   Realistic expectations   Blood glucose monitoring targets   Target adjustments   Setting a1c & blood glucose targets with provider   Meter selection    Technique for obtaining blood droplet   Blood glucose testing sites   Determining best times to test   Pregnancy recommendations   Data sharing with provider        The participant   Can demonstrate their glucometer procedure: Yes  Logs their BG readings:  Yes    The participant is checking his blood sugars and sharing the readings with providers. Mikki Holter, RN on 3/17/2023 at 4:49 PM    I have personally reviewed the health record, including provider notes, laboratory data and current medications before making these care and education recommendations. The time spent in this effort is included in the total time.   Total minutes: 120

## 2023-03-23 ENCOUNTER — CLINICAL SUPPORT (OUTPATIENT)
Dept: DIABETES SERVICES | Age: 43
End: 2023-03-23
Payer: MEDICAID

## 2023-03-23 DIAGNOSIS — Z79.4 TYPE 2 DIABETES MELLITUS WITH HYPERGLYCEMIA, WITH LONG-TERM CURRENT USE OF INSULIN (HCC): Primary | ICD-10-CM

## 2023-03-23 DIAGNOSIS — E11.65 TYPE 2 DIABETES MELLITUS WITH HYPERGLYCEMIA, WITH LONG-TERM CURRENT USE OF INSULIN (HCC): Primary | ICD-10-CM

## 2023-03-23 PROCEDURE — G0109 DIAB MANAGE TRN IND/GROUP: HCPCS

## 2023-03-23 NOTE — PROGRESS NOTES
[facility] for Diabetes Health  Diabetes Self-Management Education & Support Program  Encounter Note    SUMMARY  Diabetes self-care management training was completed related to taking medications and physical activity. The participant will return on March 30 to continue DSMES related to healthy coping and problem solving. The participant did not identify SMART Goal(s) and will practice knowledge and skills related to healthy eating and monitoring and being active and medications to improve diabetes self-management. EVALUATION:  Participant states that he is doing better with his diabetes management. He is currently not working and he is living with his parents for support. Her is checking his blood sugars around twice daily. His morning blood sugar was 180 mg/dL and his bed time blood sugars was 200 mg/dL. He eats his first meal of the day later because of his waking up schedule. RECOMMENDATIONS:  Encouraged him to monitor his carbohydrate amounts at meal times and snacks daily. Engage in some regular physical activity weekly. Take his diabetes medications as prescribed. Talk to his provider when ever needed about his diabetes management. TOPICS DISCUSSED TODAY:  HOW DO MY DIABETES MEDICATIONS WORK? 61  HOW DOES PHYSICAL ACTIVITY AFFECT MY DIABETES? 60    Next provider visit is scheduled for 5/30/23 with Dr. Hermila Sanchez     SMART GOAL(S)  Increase physical activity by going to [employer] for 60 minutes 3 times over the next week. (Goal set on 3/16/23)  ACHIEVEMENT OF GOAL(S) : %     DATE DSMES TOPIC EVALUATION     3/23/2023 HOW DO MY DIABETES MEDICATIONS WORK?    Type 1 medications & methods   Insulin injections   Injection sites   Type 2 medications   Oral agents   GLP-1 agonists   Hypoglycemia symptoms & treatment   Glucagon emergency kits   General guidance regarding insulin use whether Type 1, 2 or gestational diabetes   Storage of insulin   Disposal    Traveling with medications Barriers to medication adherence      The participant   Can describe the expected action & side effects of prescribed diabetes medications: Yes  Can demonstrate injection technique (if applicable): Yes    The participant needs to address alternating his insulin injection sites to prevent any scar tissue development. DATE DSMES TOPIC EVALUATION     3/23/2023 HOW DOES PHYSICAL ACTIVITY AFFECT MY DIABETES? Benefits of physical activity   Beginning a program of physical activity   Walking   Pedometers   Goal setting   Structured physical activity program   Aerobic activity   Resistance   Flexibility   Balance   Physical activity program progression   Safety issues   Barriers to physical activity   Facilitators of physical activity        The participant has established a regular physical activity plan: Yes    The participant needs to address continuing to exercise regularly each week. Pat Og RN on 3/23/2023 at 4:41 PM    I have personally reviewed the health record, including provider notes, laboratory data and current medications before making these care and education recommendations. The time spent in this effort is included in the total time.   Total minutes: 120

## 2023-03-30 ENCOUNTER — CLINICAL SUPPORT (OUTPATIENT)
Dept: DIABETES SERVICES | Age: 43
End: 2023-03-30
Payer: MEDICAID

## 2023-03-30 PROCEDURE — G0109 DIAB MANAGE TRN IND/GROUP: HCPCS

## 2023-04-04 NOTE — PROGRESS NOTES
[facility] for Diabetes Health  Diabetes Self-Management Education & Support Program  Encounter Note    SUMMARY  Diabetes self-care management training was completed related to healthy coping and problem solving. The participant will return on May 8 for 6 week follow up. The participant did not identify SMART Goal(s) and will practice knowledge and skills related to healthy eating and monitoring and being active and medications to improve diabetes self-management. EVALUATION:  Participant states that he is doing better with his diabetes management. He is checking his blood sugars daily and his fasting blood sugar range is 130-140 mg/dL. He is eating healthy meals daily, increasing his non-starchy vegetables with meals. He is doing some exercises at [employer] 3 times weekly. He says he has loss some weight with the lifestyle changes he is doing. He has the support of his family members with his diabetes management. RECOMMENDATIONS:  Encouraged him to continue to monitor his carbohydrate amounts at meal times, continue to engage in some regular physical activity weekly, take his medications as prescribed, and talk to his provider when needed about his diabetes management. TOPICS DISCUSSED TODAY:  HOW DO I FIND SUPPORT TO TACKLE THIS CONDITION? 60  HOW DO I FIGURE OUT WHAT'S INFLUENCING MY BLOOD GLUCOSES? 60    Next provider visit is scheduled for 5/30/2023 with Dr. Princess Herrera     SMART GOAL(S)  Increase physical activity by going to [employer] for 60 minutes 3 times over the next week. (Goal set on 3/16/23)  ACHIEVEMENT OF GOAL(S) : %     DATE DSMES TOPIC EVALUATION     4/4/2023 HOW DO I FIND SUPPORT TO TACKLE THIS CONDITION?    Normal responses to diabetes diagnosis or complication   Shock   Anger & resentment   Guilt/self-blame   Sadness & worry   Depression    Anxiety   Pregnancy   Constructive strategies to normal responses    Exploring feelings & attitudes   Motivation: Cost versus benefits analysis   Problem-solving: Chain analysis   Obtaining support: Communicating   Family & friends   Health team   Community   Stress   Symptoms   Managing stress   Fill your tool kit        The participant can identify people that support them in caring for their diabetes health: patient and father      The participant would like to pursue the following in keeping themselves healthy after completing the program:  Di[employer]    The participant needs to address talking to his providers and home support system when needed. DATE DSMES TOPIC EVALUATION     4/4/2023 HOW DO I FIGURE OUT WHAT'S INFLUENCING MY BLOOD GLUCOSES? Problem solving using [employer]   Set goals   Sort options   Arrive at a plan   Reaffirm plan   Common problems in diabetes self-care   Hypoglycemia   Hyperglycemia   Sick days   Pattern management   Disaster preparedness plan        The participant has an action plan for   Hypoglycemia: Yes  Hyperglycemia: Yes  Sick days: No  During disasters: No    The participant needs to address having a sick day and disaster plan for the future. Concepción Garcia RN on 3/30/2023 at 12:00 PM    I have personally reviewed the health record, including provider notes, laboratory data and current medications before making these care and education recommendations. The time spent in this effort is included in the total time.   Total minutes: 120

## 2023-05-08 ENCOUNTER — NURSE ONLY (OUTPATIENT)
Age: 43
End: 2023-05-08
Payer: MEDICAID

## 2023-05-08 DIAGNOSIS — Z79.4 TYPE 2 DIABETES MELLITUS WITH HYPERGLYCEMIA, WITH LONG-TERM CURRENT USE OF INSULIN (HCC): Primary | ICD-10-CM

## 2023-05-08 DIAGNOSIS — E11.65 TYPE 2 DIABETES MELLITUS WITH HYPERGLYCEMIA, WITH LONG-TERM CURRENT USE OF INSULIN (HCC): Primary | ICD-10-CM

## 2023-05-08 PROCEDURE — G0108 DIAB MANAGE TRN  PER INDIV: HCPCS

## 2023-05-08 NOTE — PROGRESS NOTES
on sick days    Pattern Management:  The participant can notice blood glucose patterns when looking at their blood glucose readings: No    How do you use the blood glucose readings from your meter or logbook: understand how body responds to meals      Note: Content derived from the American Association of Diabetes Educators' Diabetes Education Curriculum: A Guide to Successful Self-Management (3rd edition)      I have personally reviewed the health record, including provider notes, laboratory data and current medications before making these care and education recommendations. The time spent in this effort is included in the total time. Total minutes: 30    [facility] Logo  Diabetes Skills, Confidence & Preparedness Index (SCPI) ©  Thank you for completing the Skills, Confidence & Preparedness Index! Below are your scores. All scales and questions are out of 7. If you would like these results emailed, please enter your email address along with some identifying patient information. Email:    Patient Identifier:        Overall SCPI score: 5.3 Skills Score: 4.8  Low: Problem Solving(Q6),Reducing Risks(Q9) Confidence Score: 5.4  Low: Problem Solving(Q7) Preparedness Score: 6.0  Low: Healthy Eating(Q1),Being Active(Q2),Healthy Coping(Q3),Reducing Risks(Q4),Taking Medication(Q5),Blood Sugar Monitoring(Q6),Problem Solving(Q7)  Healthy Eating Score: 5.3  Low: Skills(Q1) Taking Medication Score: 6.0  Low: Skills(Q2),Preparedness(Q5) Blood Sugar Monitoring Score: 6.0  Low: Skills(Q3),Skills(Q4),Skills(Q8),Confidence(Q6),Preparedness(Q6) Reducing Risks Score: 5.0  Low: QODCSO(P0)  Problem Solving Score: 3.3  Low: Skills(Q6),Confidence(Q7) Healthy Coping Score: 6.0  Low: Skills(Q7),Confidence(Q2),Preparedness(Q3) Being Active Score: 6.0  Low: Confidence(Q5),Preparedness(Q2)    Skills/Knowledge Questions  1. I know how to plan meals that have the best balance between carbohydrates, proteins and vegetables. 3  2.  I know how my

## 2023-05-23 ENCOUNTER — NURSE ONLY (OUTPATIENT)
Age: 43
End: 2023-05-23

## 2023-05-23 DIAGNOSIS — E66.01 MORBID (SEVERE) OBESITY DUE TO EXCESS CALORIES (HCC): ICD-10-CM

## 2023-05-23 DIAGNOSIS — E78.2 MIXED HYPERLIPIDEMIA: ICD-10-CM

## 2023-05-23 DIAGNOSIS — E11.65 TYPE 2 DIABETES MELLITUS WITH HYPERGLYCEMIA, WITH LONG-TERM CURRENT USE OF INSULIN (HCC): Primary | ICD-10-CM

## 2023-05-23 DIAGNOSIS — Z79.4 TYPE 2 DIABETES MELLITUS WITH HYPERGLYCEMIA, WITH LONG-TERM CURRENT USE OF INSULIN (HCC): Primary | ICD-10-CM

## 2023-05-23 DIAGNOSIS — I10 ESSENTIAL (PRIMARY) HYPERTENSION: ICD-10-CM

## 2023-05-23 PROBLEM — H40.9 GLAUCOMA: Status: ACTIVE | Noted: 2017-02-17

## 2023-05-23 RX ORDER — INSULIN GLARGINE 100 [IU]/ML
INJECTION, SOLUTION SUBCUTANEOUS
COMMUNITY
Start: 2023-04-13

## 2023-05-23 RX ORDER — AMLODIPINE BESYLATE 5 MG/1
5 TABLET ORAL DAILY
COMMUNITY
Start: 2023-05-03

## 2023-05-23 RX ORDER — FLURBIPROFEN SODIUM 0.3 MG/ML
SOLUTION/ DROPS OPHTHALMIC
COMMUNITY
Start: 2023-04-01

## 2023-05-23 RX ORDER — AMMONIUM LACTATE 12 G/100G
LOTION TOPICAL
COMMUNITY
Start: 2023-04-01

## 2023-05-23 RX ORDER — HYDROCHLOROTHIAZIDE 25 MG/1
25 TABLET ORAL DAILY
COMMUNITY
Start: 2023-05-03

## 2023-05-23 RX ORDER — TIMOLOL MALEATE 5 MG/ML
SOLUTION/ DROPS OPHTHALMIC
COMMUNITY
Start: 2023-04-01

## 2023-05-23 RX ORDER — ATORVASTATIN CALCIUM 80 MG/1
80 TABLET, FILM COATED ORAL DAILY
COMMUNITY
Start: 2023-04-10

## 2023-05-23 RX ORDER — BLOOD SUGAR DIAGNOSTIC
STRIP MISCELLANEOUS
COMMUNITY
Start: 2023-02-16

## 2023-05-24 LAB
ANION GAP SERPL CALC-SCNC: 5 MMOL/L (ref 5–15)
BUN SERPL-MCNC: 10 MG/DL (ref 6–20)
BUN/CREAT SERPL: 15 (ref 12–20)
CALCIUM SERPL-MCNC: 8.8 MG/DL (ref 8.5–10.1)
CHLORIDE SERPL-SCNC: 106 MMOL/L (ref 97–108)
CO2 SERPL-SCNC: 28 MMOL/L (ref 21–32)
CREAT SERPL-MCNC: 0.66 MG/DL (ref 0.7–1.3)
CREAT UR-MCNC: 184 MG/DL
EST. AVERAGE GLUCOSE BLD GHB EST-MCNC: 301 MG/DL
GLUCOSE SERPL-MCNC: 191 MG/DL (ref 65–100)
HBA1C MFR BLD: 12.1 % (ref 4–5.6)
LDLC SERPL DIRECT ASSAY-MCNC: 57 MG/DL (ref 0–100)
MICROALBUMIN UR-MCNC: 1.53 MG/DL
MICROALBUMIN/CREAT UR-RTO: 8 MG/G (ref 0–30)
POTASSIUM SERPL-SCNC: 3.5 MMOL/L (ref 3.5–5.1)
SODIUM SERPL-SCNC: 139 MMOL/L (ref 136–145)

## 2023-05-24 RX ORDER — SIMVASTATIN 20 MG
1 TABLET ORAL NIGHTLY
COMMUNITY
Start: 2021-10-11

## 2023-05-24 RX ORDER — FUROSEMIDE 20 MG/1
TABLET ORAL
COMMUNITY
Start: 2016-09-01

## 2023-05-24 RX ORDER — PIOGLITAZONEHYDROCHLORIDE 15 MG/1
15 TABLET ORAL DAILY
COMMUNITY
Start: 2021-03-19 | End: 2023-06-06 | Stop reason: SDUPTHER

## 2023-05-24 RX ORDER — CYCLOBENZAPRINE HCL 10 MG
TABLET ORAL
COMMUNITY
Start: 2016-11-08

## 2023-05-30 ENCOUNTER — OFFICE VISIT (OUTPATIENT)
Age: 43
End: 2023-05-30
Payer: MEDICAID

## 2023-05-30 VITALS
HEIGHT: 68 IN | DIASTOLIC BLOOD PRESSURE: 87 MMHG | BODY MASS INDEX: 43.35 KG/M2 | SYSTOLIC BLOOD PRESSURE: 118 MMHG | TEMPERATURE: 98.2 F | RESPIRATION RATE: 18 BRPM | OXYGEN SATURATION: 96 % | WEIGHT: 286 LBS | HEART RATE: 82 BPM

## 2023-05-30 DIAGNOSIS — E11.65 TYPE 2 DIABETES MELLITUS WITH HYPERGLYCEMIA, UNSPECIFIED WHETHER LONG TERM INSULIN USE (HCC): Primary | ICD-10-CM

## 2023-05-30 DIAGNOSIS — E78.2 MIXED HYPERLIPIDEMIA: ICD-10-CM

## 2023-05-30 DIAGNOSIS — I10 ESSENTIAL HYPERTENSION: ICD-10-CM

## 2023-05-30 DIAGNOSIS — Z91.119 PATIENT'S NONCOMPLIANCE WITH DIETARY REGIMEN DUE TO UNSPECIFIED REASON: ICD-10-CM

## 2023-05-30 PROCEDURE — 99214 OFFICE O/P EST MOD 30 MIN: CPT | Performed by: INTERNAL MEDICINE

## 2023-05-30 PROCEDURE — 3078F DIAST BP <80 MM HG: CPT | Performed by: INTERNAL MEDICINE

## 2023-05-30 PROCEDURE — 3046F HEMOGLOBIN A1C LEVEL >9.0%: CPT | Performed by: INTERNAL MEDICINE

## 2023-05-30 PROCEDURE — 3074F SYST BP LT 130 MM HG: CPT | Performed by: INTERNAL MEDICINE

## 2023-05-30 NOTE — PATIENT INSTRUCTIONS
BRING YOUR METER AND MEDS     Check sugars before breakfast and before dinner     Lantus 100 units at night, split it in 50 units and take at two different sites      Victoza 1.2 mg in AM     Continue metformin, Actos      Humalog 25 units before breakfast and 25 units before dinner      Additional fast acting insulin Humalog or Novolog based on the scale below      Blood sugar  Fast acting insulin         150-200  Extra 5 Units        201-250  Extra 10 Units       251-300  Extra 15 Units        301-350  Extra 20  Units         351-400  Extra 25 Units

## 2023-06-06 ENCOUNTER — OFFICE VISIT (OUTPATIENT)
Age: 43
End: 2023-06-06
Payer: MEDICAID

## 2023-06-06 VITALS
RESPIRATION RATE: 18 BRPM | SYSTOLIC BLOOD PRESSURE: 129 MMHG | HEART RATE: 82 BPM | DIASTOLIC BLOOD PRESSURE: 84 MMHG | OXYGEN SATURATION: 95 % | TEMPERATURE: 98.7 F

## 2023-06-06 DIAGNOSIS — I10 ESSENTIAL HYPERTENSION: ICD-10-CM

## 2023-06-06 DIAGNOSIS — E11.65 TYPE 2 DIABETES MELLITUS WITH HYPERGLYCEMIA, UNSPECIFIED WHETHER LONG TERM INSULIN USE (HCC): Primary | ICD-10-CM

## 2023-06-06 DIAGNOSIS — E78.2 MIXED HYPERLIPIDEMIA: ICD-10-CM

## 2023-06-06 PROCEDURE — 99215 OFFICE O/P EST HI 40 MIN: CPT | Performed by: INTERNAL MEDICINE

## 2023-06-06 PROCEDURE — 3074F SYST BP LT 130 MM HG: CPT | Performed by: INTERNAL MEDICINE

## 2023-06-06 PROCEDURE — 3078F DIAST BP <80 MM HG: CPT | Performed by: INTERNAL MEDICINE

## 2023-06-06 PROCEDURE — 3046F HEMOGLOBIN A1C LEVEL >9.0%: CPT | Performed by: INTERNAL MEDICINE

## 2023-06-06 RX ORDER — PIOGLITAZONEHYDROCHLORIDE 15 MG/1
15 TABLET ORAL DAILY
Qty: 30 TABLET | Refills: 11 | Status: SHIPPED | OUTPATIENT
Start: 2023-06-06

## 2023-06-06 NOTE — PROGRESS NOTES
Eloise Bence is a 39 y.o. male here for   Chief Complaint   Patient presents with    Diabetes       1. Have you been to the ER or an urgent care clinic since your last visit?  - no    2. Have you been hospitalized since your last visit? - no    3. Have you seen or consulted any other health care providers outside of the [address] [place] [address] since your last visit?   Include any pap smears or colon screening.- PCP
meter, medications including insulin and also bring a family member      2. HTN :  Continue current therapy , CPAP       Anaphylaxis with ACE       3. Hyperlipidemia :  Continue statin. 4.Obesity:Body mass index is 43.33 kg/m². Discussed about the importance of exercise and carbohydrate portion control. TSH normal    Low-dose dexamethasone suppression test normal         5 ALMA DELIA - CPAP -      According to his father: He is on high carb diet, eats out mostly, missing insulin  Discussed the complications with the patient as well as the family, to cut down the portions, start lispro/Humalog 25 units before breakfast and dinner         Patient Instructions     Check sugars before breakfast and before dinner      Lantus 100 units at night, split it in 50 units and take at two different sites       Victoza 1.2 mg in AM      Continue metformin, Actos       Humalog 25 units before breakfast and 25 units before dinner       Additional fast acting insulin Humalog or Novolog based on the scale below       Blood sugar  Fast acting insulin          150-200  Extra 5 Units         201-250  Extra 10 Units         251-300  Extra 15 Units         301-350  Extra 20  Units          351-400  Extra 25 Units            Spent > 40 minutes on the day of the visit reviewing chart, examining, ordering/reviewing labs, counseling, discussing therapeutics and documentation in the medical record      Thank you for allowing me to participate in the care of this patient.       aZne Sharif MD

## 2023-06-26 ENCOUNTER — OFFICE VISIT (OUTPATIENT)
Age: 43
End: 2023-06-26
Payer: MEDICAID

## 2023-06-26 VITALS
HEART RATE: 94 BPM | SYSTOLIC BLOOD PRESSURE: 134 MMHG | WEIGHT: 290 LBS | HEIGHT: 68 IN | RESPIRATION RATE: 18 BRPM | TEMPERATURE: 98.7 F | BODY MASS INDEX: 43.95 KG/M2 | DIASTOLIC BLOOD PRESSURE: 95 MMHG | OXYGEN SATURATION: 97 %

## 2023-06-26 DIAGNOSIS — Z91.199 NONADHERENCE TO MEDICAL TREATMENT: ICD-10-CM

## 2023-06-26 DIAGNOSIS — E11.65 TYPE 2 DIABETES MELLITUS WITH HYPERGLYCEMIA, UNSPECIFIED WHETHER LONG TERM INSULIN USE (HCC): Primary | ICD-10-CM

## 2023-06-26 DIAGNOSIS — E78.2 MIXED HYPERLIPIDEMIA: ICD-10-CM

## 2023-06-26 DIAGNOSIS — I10 ESSENTIAL HYPERTENSION: ICD-10-CM

## 2023-06-26 PROBLEM — E11.9 DIABETES MELLITUS (HCC): Status: ACTIVE | Noted: 2017-02-16

## 2023-06-26 PROCEDURE — 99215 OFFICE O/P EST HI 40 MIN: CPT | Performed by: INTERNAL MEDICINE

## 2023-06-26 PROCEDURE — 3046F HEMOGLOBIN A1C LEVEL >9.0%: CPT | Performed by: INTERNAL MEDICINE

## 2023-06-26 PROCEDURE — 3075F SYST BP GE 130 - 139MM HG: CPT | Performed by: INTERNAL MEDICINE

## 2023-06-26 PROCEDURE — 3080F DIAST BP >= 90 MM HG: CPT | Performed by: INTERNAL MEDICINE

## 2023-07-03 DIAGNOSIS — E11.65 TYPE 2 DIABETES MELLITUS WITH HYPERGLYCEMIA, UNSPECIFIED WHETHER LONG TERM INSULIN USE (HCC): Primary | ICD-10-CM

## 2023-07-03 RX ORDER — DULAGLUTIDE 0.75 MG/.5ML
INJECTION, SOLUTION SUBCUTANEOUS
Qty: 2 ML | Refills: 3 | Status: SHIPPED | OUTPATIENT
Start: 2023-07-03

## 2023-08-16 ENCOUNTER — OFFICE VISIT (OUTPATIENT)
Age: 43
End: 2023-08-16
Payer: MEDICAID

## 2023-08-16 DIAGNOSIS — E11.65 TYPE 2 DIABETES MELLITUS WITH HYPERGLYCEMIA, WITH LONG-TERM CURRENT USE OF INSULIN (HCC): Primary | ICD-10-CM

## 2023-08-16 DIAGNOSIS — E78.2 MIXED HYPERLIPIDEMIA: ICD-10-CM

## 2023-08-16 DIAGNOSIS — Z79.4 TYPE 2 DIABETES MELLITUS WITH HYPERGLYCEMIA, WITH LONG-TERM CURRENT USE OF INSULIN (HCC): Primary | ICD-10-CM

## 2023-08-16 DIAGNOSIS — I10 PRIMARY HYPERTENSION: ICD-10-CM

## 2023-08-16 PROCEDURE — 3046F HEMOGLOBIN A1C LEVEL >9.0%: CPT | Performed by: INTERNAL MEDICINE

## 2023-08-16 PROCEDURE — 99214 OFFICE O/P EST MOD 30 MIN: CPT | Performed by: INTERNAL MEDICINE

## 2023-08-16 RX ORDER — DULAGLUTIDE 1.5 MG/.5ML
INJECTION, SOLUTION SUBCUTANEOUS
Qty: 2 ML | Refills: 5 | Status: SHIPPED | OUTPATIENT
Start: 2023-08-16

## 2023-08-17 VITALS
DIASTOLIC BLOOD PRESSURE: 91 MMHG | OXYGEN SATURATION: 96 % | WEIGHT: 291 LBS | RESPIRATION RATE: 22 BRPM | TEMPERATURE: 98.8 F | BODY MASS INDEX: 44.1 KG/M2 | HEIGHT: 68 IN | HEART RATE: 87 BPM | SYSTOLIC BLOOD PRESSURE: 127 MMHG

## 2023-08-18 LAB — HBA1C MFR BLD: 11.3 % (ref 4.8–5.6)

## 2023-10-06 DIAGNOSIS — E11.65 TYPE 2 DIABETES MELLITUS WITH HYPERGLYCEMIA (HCC): ICD-10-CM

## 2023-10-08 RX ORDER — INSULIN GLARGINE 100 [IU]/ML
INJECTION, SOLUTION SUBCUTANEOUS
Qty: 30 ML | Refills: 3 | Status: SHIPPED | OUTPATIENT
Start: 2023-10-08

## 2023-11-07 ENCOUNTER — NURSE ONLY (OUTPATIENT)
Age: 43
End: 2023-11-07

## 2023-11-07 DIAGNOSIS — E78.2 MIXED HYPERLIPIDEMIA: ICD-10-CM

## 2023-11-07 DIAGNOSIS — I10 PRIMARY HYPERTENSION: ICD-10-CM

## 2023-11-07 DIAGNOSIS — E11.65 TYPE 2 DIABETES MELLITUS WITH HYPERGLYCEMIA, WITH LONG-TERM CURRENT USE OF INSULIN (HCC): ICD-10-CM

## 2023-11-07 DIAGNOSIS — Z79.4 TYPE 2 DIABETES MELLITUS WITH HYPERGLYCEMIA, WITH LONG-TERM CURRENT USE OF INSULIN (HCC): ICD-10-CM

## 2023-11-08 LAB
ANION GAP SERPL CALC-SCNC: 4 MMOL/L (ref 5–15)
BUN SERPL-MCNC: 8 MG/DL (ref 6–20)
BUN/CREAT SERPL: 11 (ref 12–20)
CALCIUM SERPL-MCNC: 9.2 MG/DL (ref 8.5–10.1)
CHLORIDE SERPL-SCNC: 105 MMOL/L (ref 97–108)
CO2 SERPL-SCNC: 31 MMOL/L (ref 21–32)
CREAT SERPL-MCNC: 0.72 MG/DL (ref 0.7–1.3)
CREAT UR-MCNC: 131 MG/DL
EST. AVERAGE GLUCOSE BLD GHB EST-MCNC: 249 MG/DL
GLUCOSE SERPL-MCNC: 92 MG/DL (ref 65–100)
HBA1C MFR BLD: 10.3 % (ref 4–5.6)
LDLC SERPL DIRECT ASSAY-MCNC: 47 MG/DL (ref 0–100)
MICROALBUMIN UR-MCNC: 0.84 MG/DL
MICROALBUMIN/CREAT UR-RTO: 6 MG/G (ref 0–30)
POTASSIUM SERPL-SCNC: 3.4 MMOL/L (ref 3.5–5.1)
SODIUM SERPL-SCNC: 140 MMOL/L (ref 136–145)

## 2023-11-17 ENCOUNTER — OFFICE VISIT (OUTPATIENT)
Age: 43
End: 2023-11-17
Payer: MEDICAID

## 2023-11-17 VITALS
RESPIRATION RATE: 18 BRPM | TEMPERATURE: 98.5 F | OXYGEN SATURATION: 96 % | SYSTOLIC BLOOD PRESSURE: 119 MMHG | HEIGHT: 68 IN | HEART RATE: 77 BPM | DIASTOLIC BLOOD PRESSURE: 82 MMHG | WEIGHT: 289 LBS | BODY MASS INDEX: 43.8 KG/M2

## 2023-11-17 DIAGNOSIS — Z79.4 TYPE 2 DIABETES MELLITUS WITH HYPERGLYCEMIA, WITH LONG-TERM CURRENT USE OF INSULIN (HCC): Primary | ICD-10-CM

## 2023-11-17 DIAGNOSIS — E66.01 MORBID OBESITY (HCC): ICD-10-CM

## 2023-11-17 DIAGNOSIS — E78.2 MIXED HYPERLIPIDEMIA: ICD-10-CM

## 2023-11-17 DIAGNOSIS — E11.65 TYPE 2 DIABETES MELLITUS WITH HYPERGLYCEMIA, WITH LONG-TERM CURRENT USE OF INSULIN (HCC): Primary | ICD-10-CM

## 2023-11-17 DIAGNOSIS — I10 PRIMARY HYPERTENSION: ICD-10-CM

## 2023-11-17 PROCEDURE — 99214 OFFICE O/P EST MOD 30 MIN: CPT | Performed by: INTERNAL MEDICINE

## 2023-11-17 PROCEDURE — 3078F DIAST BP <80 MM HG: CPT | Performed by: INTERNAL MEDICINE

## 2023-11-17 PROCEDURE — 3074F SYST BP LT 130 MM HG: CPT | Performed by: INTERNAL MEDICINE

## 2023-11-17 PROCEDURE — 3046F HEMOGLOBIN A1C LEVEL >9.0%: CPT | Performed by: INTERNAL MEDICINE

## 2023-11-17 NOTE — PATIENT INSTRUCTIONS
BRING YOUR METER AND MEDS      Check sugars before breakfast and before dinner     Lantus ( long acting insulin )  100 units at night, split it in 50 units and take at two different sites      Trulicity weekly- this is not insulin . This is a medication which helps diabetes and controls your appetite. Continue metformin, Actos      Humalog or Lispro or Novolog or Aspart ( fast/ short  acting insulin)  25 units before breakfast and 25 units before dinner . After 30 days of opening an insulin  pen discard      Additional fast acting insulin Humalog or Lispro or Novolog based on the scale below      Blood sugar               Fast acting insulin                                150-200                       Extra 5 Units                                        201-250                       Extra 10 Units                                        251-300                       Extra 15 Units                             301-350                       Extra 20  Units                                                    351-400                       Extra 25 Units .

## 2023-11-17 NOTE — PROGRESS NOTES
Humberto Philippe is a 39 y.o. male here for   Chief Complaint   Patient presents with    Diabetes       1. Have you been to the ER, urgent care clinic since your last visit? Hospitalized since your last visit? - no    2. Have you seen or consulted any other health care providers outside of the [address] [place] [address] Avenue since your last visit?   Include any pap smears or colon screening.- no
exercise   No blurry vision,no pain the feet    History of nonadherence to diet         No fever, cough, shortness of breath        History from February 2019   When ever insulin is expensive he stops and then has hyperglycemia , goes to ER . I have discussed with him several times about getting Lantus for less than $10 with coupon and given coupon   Also discussed about [employer] brand insulin      He has severe insulin resistance. Past Medical History:   Diagnosis Date    Bronchitis     Diabetes (HCC)     HTN (hypertension)     Hyperlipidemia        Current Outpatient Medications   Medication Sig    LANTUS SOLOSTAR 100 UNIT/ML injection pen INJECT 100 UNITS AT NIGHT. D98.22    TRULICITY 1.5 CZ/5.5SJ SC injection Inject 1.5mg subcutaneously every 7 days    metFORMIN (GLUCOPHAGE) 1000 MG tablet Take 1 tablet by mouth 2 times daily (with meals)    pioglitazone (ACTOS) 15 MG tablet Take 1 tablet by mouth daily    insulin lispro (HUMALOG) 100 UNIT/ML injection vial Inject 25 units before breakfast and dinner with SSI.  Max units daily:75    Cholecalciferol (VITAMIN D3) 25 MCG (1000 UT) CAPS 1 cap(s) orally once a day    cyanocobalamin 100 MCG tablet 1 tablet    cyclobenzaprine (FLEXERIL) 10 MG tablet TAKE 1 TABLET TWICE A DAY AS NEEDED    furosemide (LASIX) 20 MG tablet Take 1 tab every 3 days as neeed    ammonium lactate (LAC-HYDRIN) 12 % lotion APPLY TOPICALLY AS NEEDED FOR DRY SKIN.    amLODIPine (NORVASC) 5 MG tablet Take 1 tablet by mouth daily    atorvastatin (LIPITOR) 80 MG tablet Take 1 tablet by mouth daily    ONETOUCH ULTRA strip TEST BLOOD GLUCOSE THREE TIMES DAILY DX CODE: E11.65    hydroCHLOROthiazide (HYDRODIURIL) 25 MG tablet Take 1 tablet by mouth daily    B-D UF III MINI PEN NEEDLES 31G X 5 MM MISC USE TO INJECT INSULIN AND VICTOZA DAILY    metoprolol tartrate (LOPRESSOR) 25 MG tablet Take 1 tablet by mouth 2 times daily    timolol (TIMOPTIC) 0.5 % ophthalmic solution INSTILL 1 DROP INTO

## 2024-01-06 DIAGNOSIS — E11.65 TYPE 2 DIABETES MELLITUS WITH HYPERGLYCEMIA (HCC): ICD-10-CM

## 2024-01-06 RX ORDER — FLURBIPROFEN SODIUM 0.3 MG/ML
SOLUTION/ DROPS OPHTHALMIC
Qty: 100 EACH | Refills: 5 | Status: SHIPPED | OUTPATIENT
Start: 2024-01-06

## 2024-03-01 DIAGNOSIS — E11.65 TYPE 2 DIABETES MELLITUS WITH HYPERGLYCEMIA (HCC): ICD-10-CM

## 2024-03-01 RX ORDER — BLOOD SUGAR DIAGNOSTIC
STRIP MISCELLANEOUS
Qty: 100 STRIP | Refills: 11 | Status: SHIPPED | OUTPATIENT
Start: 2024-03-01

## 2024-03-15 ENCOUNTER — OFFICE VISIT (OUTPATIENT)
Age: 44
End: 2024-03-15
Payer: MEDICAID

## 2024-03-15 VITALS
TEMPERATURE: 99 F | HEART RATE: 87 BPM | HEIGHT: 68 IN | DIASTOLIC BLOOD PRESSURE: 89 MMHG | RESPIRATION RATE: 22 BRPM | SYSTOLIC BLOOD PRESSURE: 131 MMHG | BODY MASS INDEX: 43.35 KG/M2 | WEIGHT: 286 LBS | OXYGEN SATURATION: 96 %

## 2024-03-15 DIAGNOSIS — E66.01 MORBID OBESITY (HCC): ICD-10-CM

## 2024-03-15 DIAGNOSIS — I10 PRIMARY HYPERTENSION: ICD-10-CM

## 2024-03-15 DIAGNOSIS — E78.2 MIXED HYPERLIPIDEMIA: ICD-10-CM

## 2024-03-15 DIAGNOSIS — E11.65 TYPE 2 DIABETES MELLITUS WITH HYPERGLYCEMIA, WITH LONG-TERM CURRENT USE OF INSULIN (HCC): Primary | ICD-10-CM

## 2024-03-15 DIAGNOSIS — E11.65 TYPE 2 DIABETES MELLITUS WITH HYPERGLYCEMIA (HCC): ICD-10-CM

## 2024-03-15 DIAGNOSIS — Z79.4 TYPE 2 DIABETES MELLITUS WITH HYPERGLYCEMIA, WITH LONG-TERM CURRENT USE OF INSULIN (HCC): Primary | ICD-10-CM

## 2024-03-15 LAB — HBA1C MFR BLD: 10.9 % (ref 4.8–5.6)

## 2024-03-15 PROCEDURE — 3079F DIAST BP 80-89 MM HG: CPT | Performed by: INTERNAL MEDICINE

## 2024-03-15 PROCEDURE — 3075F SYST BP GE 130 - 139MM HG: CPT | Performed by: INTERNAL MEDICINE

## 2024-03-15 PROCEDURE — 99215 OFFICE O/P EST HI 40 MIN: CPT | Performed by: INTERNAL MEDICINE

## 2024-03-15 PROCEDURE — 3046F HEMOGLOBIN A1C LEVEL >9.0%: CPT | Performed by: INTERNAL MEDICINE

## 2024-03-15 RX ORDER — INSULIN GLARGINE 100 [IU]/ML
INJECTION, SOLUTION SUBCUTANEOUS
Qty: 30 ML | Refills: 5 | Status: SHIPPED | OUTPATIENT
Start: 2024-03-15

## 2024-03-15 RX ORDER — ROSUVASTATIN CALCIUM 10 MG/1
10 TABLET, COATED ORAL NIGHTLY
Qty: 30 TABLET | Refills: 3 | Status: SHIPPED | OUTPATIENT
Start: 2024-03-15 | End: 2024-03-15

## 2024-03-15 NOTE — PROGRESS NOTES
Lantus, Victoza, Metformin, Actos  Blood glucose monitor: Did not bring it  Reportedly checking the blood glucose ,   Fasting 140 - 180 , no log   Weight has been fluctuating     He denies drinking sodas, very limited activity, he is working at a restaurant, [employer]  Tolerating Victoza    Lunch - tuna sandwich,grilled chicken sandwich     Dinner- fish , stir vasquez ( no sauce) brocc/nitza , denies eating pasta , bread     No exercise   No blurry vision,no pain the feet    History of nonadherence to diet         No fever, cough, shortness of breath        History from February 2019   When ever insulin is expensive he stops and then has hyperglycemia , goes to ER .   I have discussed with him several times about getting Lantus for less than $10 with coupon and given coupon   Also discussed about [employer] brand insulin      He has severe insulin resistance.              Past Medical History:   Diagnosis Date    Bronchitis     Diabetes (HCC)     HTN (hypertension)     Hyperlipidemia        Current Outpatient Medications   Medication Sig    insulin glargine (LANTUS SOLOSTAR) 100 UNIT/ML injection pen INJECT 100 UNITS AT NIGHT. E11.65    rosuvastatin (CRESTOR) 10 MG tablet Take 1 tablet by mouth nightly    ONETOUCH ULTRA strip TEST BLOOD GLUCOSE THREE TIMES DAILY DX CODE: E11.65    Insulin Pen Needle (B-D UF III MINI PEN NEEDLES) 31G X 5 MM MISC USE TO INJECT INSULIN AND VICTOZA DAILY    TRULICITY 1.5 MG/0.5ML SC injection Inject 1.5mg subcutaneously every 7 days    metFORMIN (GLUCOPHAGE) 1000 MG tablet Take 1 tablet by mouth 2 times daily (with meals)    insulin lispro (HUMALOG) 100 UNIT/ML injection vial Inject 25 units before breakfast and dinner with SSI. Max units daily:75    Cholecalciferol (VITAMIN D3) 25 MCG (1000 UT) CAPS 1 cap(s) orally once a day    cyanocobalamin 100 MCG tablet 1 tablet    cyclobenzaprine (FLEXERIL) 10 MG tablet TAKE 1 TABLET TWICE A DAY AS NEEDED    furosemide (LASIX) 20 MG tablet Take 1 tab every

## 2024-03-15 NOTE — PATIENT INSTRUCTIONS
BRING YOUR METER AND MEDS      Check sugars before breakfast and before dinner     Lantus ( long acting insulin )  100 units at night, split it in 50 units and take at two different sites      Trulicity weekly- this is not insulin . This is a medication which helps diabetes and controls your appetite.      Continue metformin,      Humalog or Lispro or Novolog or Aspart ( fast/ short  acting insulin)  25 units before breakfast and 25 units before dinner . After 30 days of opening an insulin  pen discard      Additional fast acting insulin Humalog or Lispro or Novolog based on the scale below      Blood sugar               Fast acting insulin                                150-200                       Extra 5 Units                                        201-250                       Extra 10 Units                                        251-300                       Extra 15 Units                             301-350                       Extra 20  Units                                                    351-400                       Extra 25 Units .   Noted. Thank you.

## 2024-03-16 RX ORDER — INSULIN LISPRO 100 [IU]/ML
INJECTION, SOLUTION INTRAVENOUS; SUBCUTANEOUS
Qty: 15 ML | Refills: 11 | Status: SHIPPED | OUTPATIENT
Start: 2024-03-16

## 2024-06-21 ENCOUNTER — OFFICE VISIT (OUTPATIENT)
Age: 44
End: 2024-06-21
Payer: COMMERCIAL

## 2024-06-21 VITALS
SYSTOLIC BLOOD PRESSURE: 150 MMHG | DIASTOLIC BLOOD PRESSURE: 110 MMHG | WEIGHT: 295 LBS | BODY MASS INDEX: 44.71 KG/M2 | HEART RATE: 87 BPM | RESPIRATION RATE: 20 BRPM | HEIGHT: 68 IN | TEMPERATURE: 98 F

## 2024-06-21 DIAGNOSIS — E78.2 MIXED HYPERLIPIDEMIA: ICD-10-CM

## 2024-06-21 DIAGNOSIS — E11.65 TYPE 2 DIABETES MELLITUS WITH HYPERGLYCEMIA, UNSPECIFIED WHETHER LONG TERM INSULIN USE (HCC): Primary | ICD-10-CM

## 2024-06-21 DIAGNOSIS — Z91.199 NONADHERENCE TO MEDICAL TREATMENT: ICD-10-CM

## 2024-06-21 DIAGNOSIS — E66.01 MORBID OBESITY (HCC): ICD-10-CM

## 2024-06-21 PROCEDURE — 3080F DIAST BP >= 90 MM HG: CPT | Performed by: INTERNAL MEDICINE

## 2024-06-21 PROCEDURE — 99214 OFFICE O/P EST MOD 30 MIN: CPT | Performed by: INTERNAL MEDICINE

## 2024-06-21 PROCEDURE — 3075F SYST BP GE 130 - 139MM HG: CPT | Performed by: INTERNAL MEDICINE

## 2024-06-21 PROCEDURE — 3046F HEMOGLOBIN A1C LEVEL >9.0%: CPT | Performed by: INTERNAL MEDICINE

## 2024-06-21 RX ORDER — ROSUVASTATIN CALCIUM 10 MG/1
TABLET, COATED ORAL
COMMUNITY
Start: 2024-06-13

## 2024-06-21 NOTE — PATIENT INSTRUCTIONS
BRING YOUR METER AND MEDS      Check sugars before breakfast and before dinner     Lantus ( long acting insulin )  100 units at night, split it in 50 units and take at two different sites      Trulicity weekly- this is not insulin . This is a medication which helps diabetes and controls your appetite.      Continue metformin,      Humalog or Lispro or Novolog or Aspart ( fast/ short  acting insulin)  25 units before breakfast and 25 units before dinner . After 30 days of opening an insulin  pen discard      Additional fast acting insulin Humalog or Lispro or Novolog based on the scale below      Blood sugar               Fast acting insulin                                150-200                       Extra 5 Units                                        201-250                       Extra 10 Units                                        251-300                       Extra 15 Units                             301-350                       Extra 20  Units                                                    351-400                       Extra 25 Units .

## 2024-06-21 NOTE — PROGRESS NOTES
[facility] DIABETES AND ENDOCRINOLOGY                 Kami Ferrell MD        Patient Information     Name : Korey Lynn       YOB: 1980          Referred by: Adrianne Gordon MD /MD Qamar    Father's contact number [phone]    Chief Complaint   Patient presents with    Type 2 diabetes mellitus with hyperglycemia, with long-term     History of Present Illness: Korey Lynn is here  for fu  of  Type 2 Diabetes Mellitus   On MDI,  No family member with the patient, did not bring the medication bottles  Has meter, -260, checking blood glucose intermittently  Working at [employer] 4 days a week  Getting physical activity  On Lantus  Humalog before each meal      Prior history  Reports to be taking insulin, Lantus and Humalog, the last prescription of Humalog was sent 2023 which is more than a year ago, reports to be having a lot of Humalog at home, asking for the refill on Lantus only  Called the pharmacy Called [facility], the last Humalog fill was a year ago, 2023, this is 2024, states that he still has a lot of Humalog left, nonadherence  Discussed with the father multiple times to accompany him for the visit, did not happen    On Trulicity tolerating well   No hypoglycemia       23  BG in 200s  Pt brought his meds - had several duplicates and  meds. No Metformin, no Actos  Did not bring insulin  Father could not come, patient called his father and discussed with him  Only uses Victoza if BG is >300  Takes Lantus 100 units nightly  Not taking Humalog         23  Checking BG once a day  Has seen dietician - has another appt 3/9/23  Taking insulin as prescribed per patient  Did not bring the meter, just has the logbook with the blood glucose has been checked once daily, ranging from 120-190     23  No meter, log, does not recall the readings  A1c is very high  Weight has been fluctuating  Lantus 80 units, instead of 100 units   Victoza,

## 2024-06-21 NOTE — PROGRESS NOTES
Identified pt with two pt identifiers(name and ). Reviewed record in preparation for visit and have obtained necessary documentation. All patient medications has been reviewed.  Chief Complaint   Patient presents with    Type 2 diabetes mellitus with hyperglycemia, with long-term       Vitals:    24 1415   BP: (!) 137/94   Pulse: 87   Resp: 20   Temp: 98 °F (36.7 °C)                   Coordination of Care Questionnaire:   1) Have you been to an emergency room, urgent care, or hospitalized since your last visit?   no       2. Have seen or consulted any other health care provider since your last visit? no        Patient is accompanied by self I have received verbal consent from Korey Lynn to discuss any/all medical information while they are present in the room.

## 2024-07-02 DIAGNOSIS — E11.65 TYPE 2 DIABETES MELLITUS WITH HYPERGLYCEMIA (HCC): ICD-10-CM

## 2024-07-02 RX ORDER — ROSUVASTATIN CALCIUM 10 MG/1
10 TABLET, COATED ORAL NIGHTLY
Qty: 90 TABLET | Refills: 1 | Status: SHIPPED | OUTPATIENT
Start: 2024-07-02

## 2024-10-18 ENCOUNTER — LAB (OUTPATIENT)
Age: 44
End: 2024-10-18

## 2024-10-18 DIAGNOSIS — E11.65 TYPE 2 DIABETES MELLITUS WITH HYPERGLYCEMIA, UNSPECIFIED WHETHER LONG TERM INSULIN USE (HCC): ICD-10-CM

## 2024-10-18 DIAGNOSIS — E78.2 MIXED HYPERLIPIDEMIA: ICD-10-CM

## 2024-10-18 LAB
ANION GAP SERPL CALC-SCNC: 7 MMOL/L (ref 2–12)
BUN SERPL-MCNC: 9 MG/DL (ref 6–20)
BUN/CREAT SERPL: 12 (ref 12–20)
CALCIUM SERPL-MCNC: 8.9 MG/DL (ref 8.5–10.1)
CHLORIDE SERPL-SCNC: 104 MMOL/L (ref 97–108)
CO2 SERPL-SCNC: 30 MMOL/L (ref 21–32)
CREAT SERPL-MCNC: 0.73 MG/DL (ref 0.7–1.3)
EST. AVERAGE GLUCOSE BLD GHB EST-MCNC: 280 MG/DL
GLUCOSE SERPL-MCNC: 209 MG/DL (ref 65–100)
HBA1C MFR BLD: 11.4 % (ref 4–5.6)
POTASSIUM SERPL-SCNC: 3.7 MMOL/L (ref 3.5–5.1)
SODIUM SERPL-SCNC: 141 MMOL/L (ref 136–145)

## 2024-10-19 DIAGNOSIS — E11.65 TYPE 2 DIABETES MELLITUS WITH HYPERGLYCEMIA (HCC): ICD-10-CM

## 2024-10-21 RX ORDER — INSULIN GLARGINE 100 [IU]/ML
100 INJECTION, SOLUTION SUBCUTANEOUS NIGHTLY
Qty: 90 ML | Refills: 3 | Status: SHIPPED | OUTPATIENT
Start: 2024-10-21 | End: 2024-10-25 | Stop reason: SDUPTHER

## 2024-10-25 ENCOUNTER — OFFICE VISIT (OUTPATIENT)
Age: 44
End: 2024-10-25
Payer: COMMERCIAL

## 2024-10-25 VITALS
BODY MASS INDEX: 43.68 KG/M2 | RESPIRATION RATE: 22 BRPM | WEIGHT: 288.2 LBS | HEART RATE: 87 BPM | TEMPERATURE: 98.7 F | SYSTOLIC BLOOD PRESSURE: 122 MMHG | OXYGEN SATURATION: 97 % | HEIGHT: 68 IN | DIASTOLIC BLOOD PRESSURE: 87 MMHG

## 2024-10-25 DIAGNOSIS — E11.65 TYPE 2 DIABETES MELLITUS WITH HYPERGLYCEMIA, WITH LONG-TERM CURRENT USE OF INSULIN (HCC): Primary | ICD-10-CM

## 2024-10-25 DIAGNOSIS — Z79.4 TYPE 2 DIABETES MELLITUS WITH HYPERGLYCEMIA, WITH LONG-TERM CURRENT USE OF INSULIN (HCC): Primary | ICD-10-CM

## 2024-10-25 DIAGNOSIS — E78.2 MIXED HYPERLIPIDEMIA: ICD-10-CM

## 2024-10-25 DIAGNOSIS — E11.65 TYPE 2 DIABETES MELLITUS WITH HYPERGLYCEMIA (HCC): ICD-10-CM

## 2024-10-25 PROCEDURE — 99214 OFFICE O/P EST MOD 30 MIN: CPT | Performed by: INTERNAL MEDICINE

## 2024-10-25 PROCEDURE — 3079F DIAST BP 80-89 MM HG: CPT | Performed by: INTERNAL MEDICINE

## 2024-10-25 PROCEDURE — 3046F HEMOGLOBIN A1C LEVEL >9.0%: CPT | Performed by: INTERNAL MEDICINE

## 2024-10-25 PROCEDURE — 3074F SYST BP LT 130 MM HG: CPT | Performed by: INTERNAL MEDICINE

## 2024-10-25 RX ORDER — DULAGLUTIDE 1.5 MG/.5ML
1.5 INJECTION, SOLUTION SUBCUTANEOUS WEEKLY
Qty: 6 ML | Refills: 3 | Status: SHIPPED | OUTPATIENT
Start: 2024-10-25

## 2024-10-25 RX ORDER — INSULIN LISPRO 100 [IU]/ML
INJECTION, SOLUTION INTRAVENOUS; SUBCUTANEOUS
Qty: 75 ML | Refills: 11 | Status: SHIPPED | OUTPATIENT
Start: 2024-10-25

## 2024-10-25 RX ORDER — ROSUVASTATIN CALCIUM 10 MG/1
10 TABLET, COATED ORAL NIGHTLY
Qty: 90 TABLET | Refills: 3 | Status: SHIPPED | OUTPATIENT
Start: 2024-10-25

## 2024-10-25 RX ORDER — INSULIN GLARGINE 100 [IU]/ML
100 INJECTION, SOLUTION SUBCUTANEOUS NIGHTLY
Qty: 90 ML | Refills: 3 | Status: SHIPPED | OUTPATIENT
Start: 2024-10-25

## 2024-10-25 NOTE — PROGRESS NOTES
[facility] DIABETES AND ENDOCRINOLOGY                 Kami Ferrell MD        Patient Information     Name : Korey Lynn       YOB: 1980          Referred by: Adrianne Gordon MD /MD Qamar    Father's contact number [phone]    Chief Complaint   Patient presents with    Diabetes     History of Present Illness: Korey Lynn is here  for fu  of  Type 2 Diabetes Mellitus   On MDI,    Has meter, -260, checking blood glucose intermittently  Working at [employer] 4 days a week  Getting physical activity  On Lantus   missing Humalog      Prior history  Reports to be taking insulin, Lantus and Humalog, the last prescription of Humalog was sent 2023 which is more than a year ago, reports to be having a lot of Humalog at home, asking for the refill on Lantus only  Called the pharmacy Called [facility] pharmacy, the last Humalog fill was a year ago, 2023, this is 2024, states that he still has a lot of Humalog left, nonadherence  Discussed with the father multiple times to accompany him for the visit, did not happen    On Trulicity tolerating well   No hypoglycemia       23  BG in 200s  Pt brought his meds - had several duplicates and  meds. No Metformin, no Actos  Did not bring insulin  Father could not come, patient called his father and discussed with him  Only uses Victoza if BG is >300  Takes Lantus 100 units nightly  Not taking Humalog         23  Checking BG once a day  Has seen dietician - has another appt 3/9/23  Taking insulin as prescribed per patient  Did not bring the meter, just has the logbook with the blood glucose has been checked once daily, ranging from 120-190     23  No meter, log, does not recall the readings  A1c is very high  Weight has been fluctuating  Lantus 80 units, instead of 100 units   Victoza, Metformin, Actos  Pt unable to identify starchy foods, itzel  Has seen dietician in the past  Admitted to eating candy half a bag of

## 2024-12-20 ENCOUNTER — OFFICE VISIT (OUTPATIENT)
Age: 44
End: 2024-12-20
Payer: COMMERCIAL

## 2024-12-20 DIAGNOSIS — E11.65 TYPE 2 DIABETES MELLITUS WITH HYPERGLYCEMIA, UNSPECIFIED WHETHER LONG TERM INSULIN USE (HCC): Primary | ICD-10-CM

## 2024-12-20 DIAGNOSIS — G62.9 POLYNEUROPATHY, UNSPECIFIED: ICD-10-CM

## 2024-12-20 PROCEDURE — 3046F HEMOGLOBIN A1C LEVEL >9.0%: CPT | Performed by: INTERNAL MEDICINE

## 2024-12-20 PROCEDURE — 99214 OFFICE O/P EST MOD 30 MIN: CPT | Performed by: INTERNAL MEDICINE

## 2024-12-20 NOTE — PROGRESS NOTES
Results   Component Value Date    LABA1C 11.4 (H) 10/18/2024     History of nonadherence to the medications, diet, insulin resistance  Checking blood glucose only once a day, not as recommended   Lantus 100 units,  Humalog 25 units before breakfast and before dinner:    Counseled again, seen dietitian in the past, refer to nutritionist again, list of starches and protein given to the patient.  Trulicity weekly   Weight loss stressed   metformin   DKA x2, PAM negative   Flavored water, avoid juices sodas including diet drinks, cut down on snack portions     Despite requesting multiple times, family members did not accompany patient     2.  HTN :  Continue current therapy , CPAP       Anaphylaxis with ACE       3. Hyperlipidemia :  Continue statin.      4.Obesity:Body mass index is 43.33 kg/m².   Discussed about the importance of exercise and carbohydrate portion control.   TSH normal    Low-dose dexamethasone suppression test normal         5 ALMA DELIA - CPAP -              Thank you for allowing me to participate in the care of this patient.      Kami Ferrell MD

## 2025-01-31 DIAGNOSIS — E11.65 TYPE 2 DIABETES MELLITUS WITH HYPERGLYCEMIA (HCC): ICD-10-CM

## 2025-02-03 RX ORDER — FLURBIPROFEN SODIUM 0.3 MG/ML
SOLUTION/ DROPS OPHTHALMIC
Qty: 400 EACH | Refills: 3 | Status: SHIPPED | OUTPATIENT
Start: 2025-02-03

## 2025-04-18 ENCOUNTER — LAB (OUTPATIENT)
Age: 45
End: 2025-04-18

## 2025-04-18 DIAGNOSIS — E11.65 TYPE 2 DIABETES MELLITUS WITH HYPERGLYCEMIA, WITH LONG-TERM CURRENT USE OF INSULIN (HCC): ICD-10-CM

## 2025-04-18 DIAGNOSIS — Z79.4 TYPE 2 DIABETES MELLITUS WITH HYPERGLYCEMIA, WITH LONG-TERM CURRENT USE OF INSULIN (HCC): ICD-10-CM

## 2025-04-20 LAB
CREAT UR-MCNC: 246 MG/DL
EST. AVERAGE GLUCOSE BLD GHB EST-MCNC: 315 MG/DL
HBA1C MFR BLD: 12.6 % (ref 4–5.6)
LDLC SERPL DIRECT ASSAY-MCNC: 68 MG/DL (ref 0–100)
MICROALBUMIN UR-MCNC: 3.16 MG/DL
MICROALBUMIN/CREAT UR-RTO: 13 MG/G (ref 0–30)

## 2025-04-25 ENCOUNTER — OFFICE VISIT (OUTPATIENT)
Age: 45
End: 2025-04-25
Payer: COMMERCIAL

## 2025-04-25 VITALS
SYSTOLIC BLOOD PRESSURE: 126 MMHG | BODY MASS INDEX: 43.35 KG/M2 | HEIGHT: 68 IN | RESPIRATION RATE: 22 BRPM | WEIGHT: 286 LBS | OXYGEN SATURATION: 96 % | HEART RATE: 85 BPM | TEMPERATURE: 98.6 F | DIASTOLIC BLOOD PRESSURE: 84 MMHG

## 2025-04-25 DIAGNOSIS — I10 PRIMARY HYPERTENSION: ICD-10-CM

## 2025-04-25 DIAGNOSIS — Z79.4 TYPE 2 DIABETES MELLITUS WITH HYPERGLYCEMIA, WITH LONG-TERM CURRENT USE OF INSULIN (HCC): Primary | ICD-10-CM

## 2025-04-25 DIAGNOSIS — E78.2 MIXED HYPERLIPIDEMIA: ICD-10-CM

## 2025-04-25 DIAGNOSIS — E11.65 TYPE 2 DIABETES MELLITUS WITH HYPERGLYCEMIA, WITH LONG-TERM CURRENT USE OF INSULIN (HCC): Primary | ICD-10-CM

## 2025-04-25 PROBLEM — N49.2 CELLULITIS OF SCROTUM: Status: ACTIVE | Noted: 2018-05-10

## 2025-04-25 PROCEDURE — G2211 COMPLEX E/M VISIT ADD ON: HCPCS | Performed by: INTERNAL MEDICINE

## 2025-04-25 PROCEDURE — 3074F SYST BP LT 130 MM HG: CPT | Performed by: INTERNAL MEDICINE

## 2025-04-25 PROCEDURE — 3079F DIAST BP 80-89 MM HG: CPT | Performed by: INTERNAL MEDICINE

## 2025-04-25 PROCEDURE — 3046F HEMOGLOBIN A1C LEVEL >9.0%: CPT | Performed by: INTERNAL MEDICINE

## 2025-04-25 PROCEDURE — 99214 OFFICE O/P EST MOD 30 MIN: CPT | Performed by: INTERNAL MEDICINE

## 2025-04-25 RX ORDER — INSULIN LISPRO 100 [IU]/ML
INJECTION, SOLUTION INTRAVENOUS; SUBCUTANEOUS
Qty: 100 ML | Refills: 11 | Status: SHIPPED | OUTPATIENT
Start: 2025-04-25

## 2025-04-25 ASSESSMENT — PATIENT HEALTH QUESTIONNAIRE - PHQ9
SUM OF ALL RESPONSES TO PHQ QUESTIONS 1-9: 0
2. FEELING DOWN, DEPRESSED OR HOPELESS: NOT AT ALL
SUM OF ALL RESPONSES TO PHQ QUESTIONS 1-9: 0
1. LITTLE INTEREST OR PLEASURE IN DOING THINGS: NOT AT ALL

## 2025-04-25 NOTE — PATIENT INSTRUCTIONS
BRING YOUR METER AND MEDS      Check sugars before breakfast and before dinner     Lantus ( long acting insulin )  100 units at night, split it in 50 units and take at two different sites      Trulicity weekly- this is not insulin . This is a medication which helps diabetes and controls your appetite.      Continue metformin,      Humalog or Lispro or Novolog or Aspart ( fast/ short  acting insulin)  25 units before breakfast , lunch and 25 units before dinner . After 30 days of opening an insulin  pen discard      Additional fast acting insulin Humalog or Lispro or Novolog based on the scale below      Blood sugar               Fast acting insulin                                150-200                       Extra 5 Units                                        201-250                       Extra 10 Units                                        251-300                       Extra 15 Units                             301-350                       Extra 20  Units                                                    351-400                       Extra 25 Units .

## 2025-04-25 NOTE — PROGRESS NOTES
Korey Lynn is a 44 y.o. male here for   Chief Complaint   Patient presents with    Diabetes       1. Have you been to the ER, urgent care clinic since your last visit?  Hospitalized since your last visit? -no    2. Have you seen or consulted any other health care providers outside of the [facility] since your last visit?  Include any pap smears or colon screening.-no    
Date    LABA1C 12.6 (H) 04/18/2025    LABA1C 11.4 (H) 10/18/2024    LABA1C 10.9 (H) 03/14/2024         Lab Results   Component Value Date     10/18/2024    K 3.7 10/18/2024     10/18/2024    CO2 30 10/18/2024    BUN 9 10/18/2024    CREATININE 0.73 10/18/2024    GFRAA 139 02/07/2022    GLUCOSE 209 (H) 10/18/2024    CALCIUM 8.9 10/18/2024                Assessment/Plan:         1. Type 2 diabetes mellitus with hyperglycemia, with long-term current use of insulin (HCC)    2. Primary hypertension    3. Mixed hyperlipidemia              1. Type 2 Diabetes Mellitus     Lab Results   Component Value Date    LABA1C 12.6 (H) 04/18/2025     History of nonadherence to the medications, diet, insulin resistance  Checking blood glucose only once a day, not as recommended   Lantus 100 units, missing insulin   Humalog 25 units before breakfast , lunch and before dinner:    Counseled again, seen dietitian in the past, refer to nutritionist again, list of starches and protein given to the patient.  Trulicity weekly   Weight loss stressed   metformin   DKA x2, PAM negative   Flavored water, avoid juices sodas including diet drinks, cut down on snack portions     Despite requesting multiple times, family members did not accompany patient     2.  HTN :  Continue current therapy , CPAP       Anaphylaxis with ACE       3. Hyperlipidemia :  Continue statin.      4.Obesity:Body mass index is 43.33 kg/m².   Discussed about the importance of exercise and carbohydrate portion control.   TSH normal    Low-dose dexamethasone suppression test normal         5 ALMA DELIA - CPAP -              Thank you for allowing me to participate in the care of this patient.      Kami Ferrell MD

## 2025-06-20 DIAGNOSIS — E11.65 TYPE 2 DIABETES MELLITUS WITH HYPERGLYCEMIA (HCC): ICD-10-CM

## 2025-06-20 RX ORDER — BLOOD SUGAR DIAGNOSTIC
STRIP MISCELLANEOUS
Qty: 100 STRIP | Refills: 11 | Status: SHIPPED | OUTPATIENT
Start: 2025-06-20

## 2025-08-22 ENCOUNTER — LAB (OUTPATIENT)
Age: 45
End: 2025-08-22

## 2025-08-22 DIAGNOSIS — E78.2 MIXED HYPERLIPIDEMIA: ICD-10-CM

## 2025-08-22 DIAGNOSIS — Z79.4 TYPE 2 DIABETES MELLITUS WITH HYPERGLYCEMIA, WITH LONG-TERM CURRENT USE OF INSULIN (HCC): ICD-10-CM

## 2025-08-22 DIAGNOSIS — I10 PRIMARY HYPERTENSION: ICD-10-CM

## 2025-08-22 DIAGNOSIS — E11.65 TYPE 2 DIABETES MELLITUS WITH HYPERGLYCEMIA, WITH LONG-TERM CURRENT USE OF INSULIN (HCC): ICD-10-CM

## 2025-08-22 LAB
ANION GAP SERPL CALC-SCNC: 10 MMOL/L (ref 2–14)
BUN SERPL-MCNC: 9 MG/DL (ref 6–20)
BUN/CREAT SERPL: 15 (ref 12–20)
CALCIUM SERPL-MCNC: 8.8 MG/DL (ref 8.6–10)
CHLORIDE SERPL-SCNC: 103 MMOL/L (ref 98–107)
CO2 SERPL-SCNC: 26 MMOL/L (ref 20–29)
CREAT SERPL-MCNC: 0.57 MG/DL (ref 0.7–1.2)
CREAT UR-MCNC: 211 MG/DL (ref 39–259)
EST. AVERAGE GLUCOSE BLD GHB EST-MCNC: 309 MG/DL
GLUCOSE SERPL-MCNC: 205 MG/DL (ref 65–100)
HBA1C MFR BLD: 12.4 % (ref 4–5.6)
MICROALBUMIN UR-MCNC: 2.57 MG/DL
MICROALBUMIN/CREAT UR-RTO: 12 MG/G
POTASSIUM SERPL-SCNC: 3.8 MMOL/L (ref 3.5–5.1)
SODIUM SERPL-SCNC: 139 MMOL/L (ref 136–145)

## 2025-08-29 ENCOUNTER — OFFICE VISIT (OUTPATIENT)
Age: 45
End: 2025-08-29
Payer: COMMERCIAL

## 2025-08-29 VITALS
OXYGEN SATURATION: 98 % | HEIGHT: 68 IN | BODY MASS INDEX: 43.12 KG/M2 | SYSTOLIC BLOOD PRESSURE: 133 MMHG | DIASTOLIC BLOOD PRESSURE: 90 MMHG | HEART RATE: 85 BPM | TEMPERATURE: 98.7 F | WEIGHT: 284.5 LBS

## 2025-08-29 DIAGNOSIS — Z79.4 TYPE 2 DIABETES MELLITUS WITH HYPERGLYCEMIA, WITH LONG-TERM CURRENT USE OF INSULIN (HCC): Primary | ICD-10-CM

## 2025-08-29 DIAGNOSIS — E78.2 MIXED HYPERLIPIDEMIA: ICD-10-CM

## 2025-08-29 DIAGNOSIS — I10 PRIMARY HYPERTENSION: ICD-10-CM

## 2025-08-29 DIAGNOSIS — E11.65 TYPE 2 DIABETES MELLITUS WITH HYPERGLYCEMIA, WITH LONG-TERM CURRENT USE OF INSULIN (HCC): Primary | ICD-10-CM

## 2025-08-29 PROCEDURE — 3046F HEMOGLOBIN A1C LEVEL >9.0%: CPT | Performed by: INTERNAL MEDICINE

## 2025-08-29 PROCEDURE — 3075F SYST BP GE 130 - 139MM HG: CPT | Performed by: INTERNAL MEDICINE

## 2025-08-29 PROCEDURE — 99214 OFFICE O/P EST MOD 30 MIN: CPT | Performed by: INTERNAL MEDICINE

## 2025-08-29 PROCEDURE — 3080F DIAST BP >= 90 MM HG: CPT | Performed by: INTERNAL MEDICINE

## 2025-08-29 RX ORDER — TIRZEPATIDE 5 MG/.5ML
5 INJECTION, SOLUTION SUBCUTANEOUS
COMMUNITY